# Patient Record
Sex: FEMALE | Race: ASIAN | NOT HISPANIC OR LATINO | URBAN - METROPOLITAN AREA
[De-identification: names, ages, dates, MRNs, and addresses within clinical notes are randomized per-mention and may not be internally consistent; named-entity substitution may affect disease eponyms.]

---

## 2017-02-03 ENCOUNTER — EMERGENCY (EMERGENCY)
Facility: HOSPITAL | Age: 72
LOS: 1 days | Discharge: HOME | End: 2017-02-03
Attending: EMERGENCY MEDICINE | Admitting: EMERGENCY MEDICINE
Payer: COMMERCIAL

## 2017-02-03 VITALS
SYSTOLIC BLOOD PRESSURE: 175 MMHG | HEART RATE: 101 BPM | TEMPERATURE: 98 F | RESPIRATION RATE: 20 BRPM | OXYGEN SATURATION: 99 % | DIASTOLIC BLOOD PRESSURE: 93 MMHG

## 2017-02-03 VITALS
RESPIRATION RATE: 18 BRPM | DIASTOLIC BLOOD PRESSURE: 71 MMHG | TEMPERATURE: 99 F | SYSTOLIC BLOOD PRESSURE: 145 MMHG | HEART RATE: 97 BPM | OXYGEN SATURATION: 98 %

## 2017-02-03 PROCEDURE — 70450 CT HEAD/BRAIN W/O DYE: CPT | Mod: 26

## 2017-02-03 PROCEDURE — 99284 EMERGENCY DEPT VISIT MOD MDM: CPT | Mod: GC

## 2017-02-03 PROCEDURE — 70450 CT HEAD/BRAIN W/O DYE: CPT

## 2017-02-03 PROCEDURE — 72125 CT NECK SPINE W/O DYE: CPT | Mod: 26

## 2017-02-03 PROCEDURE — 72125 CT NECK SPINE W/O DYE: CPT

## 2017-02-03 PROCEDURE — 99284 EMERGENCY DEPT VISIT MOD MDM: CPT | Mod: 25

## 2017-02-03 NOTE — ED PROVIDER NOTE - ATTENDING CONTRIBUTION TO CARE
fall off bed onto left side of head, pain in neck with flexion and extension, no new pain elsewhere, no parathesias, GCS 15. Milton Morelos MD (attending)

## 2017-02-03 NOTE — ED PROVIDER NOTE - OBJECTIVE STATEMENT
70yo F pmhx of pbc , and DMII on insulin co left face pain s/p mechanical fall today. Slipped and hit left maxillae on sink. Also co of grandual onselt neck pain worse with felxion or extension. did not take otc meds for pain. No loc no change in vision, pain with eye. movement, headache, jaw pain, focal numbness or weakness.

## 2017-02-03 NOTE — ED ADULT NURSE NOTE - OBJECTIVE STATEMENT
Pt ambulatory to ED in no apparent distress c/o neck and facial pain s/p trip and fall.  Pt AXOX4, gross neuro intact.  Pt has neck stiffness and pt c/o left upper check bone pain.  Pt states she tripped forward and hit face on bathroom sink.  Pt denies LOC, N/V, F/C, pain/burning on urination, dark stools, bleeding, CP, SOB.  Pt with son at bedside.  Pt well appearing.  Will continue to monitor.

## 2017-02-03 NOTE — ED PROVIDER NOTE - PLAN OF CARE
Follow up with your PMD within 48-72hrs. Take all of your medications as previously prescribed.  Apply warm compresses to your neck 2-3 times/day.  Take Tylenol 650mg 1 tab every 4-6 hours as needed for pain. Worsening or new weakness, pain, change in vision, numbness, fever, chills return to ER

## 2017-02-03 NOTE — ED PROVIDER NOTE - CARE PLAN
Principal Discharge DX:	Neck injuries, initial encounter Principal Discharge DX:	Neck injuries, initial encounter  Instructions for follow-up, activity and diet:	Follow up with your PMD within 48-72hrs. Take all of your medications as previously prescribed.  Apply warm compresses to your neck 2-3 times/day.  Take Tylenol 650mg 1 tab every 4-6 hours as needed for pain. Worsening or new weakness, pain, change in vision, numbness, fever, chills return to ER

## 2017-02-03 NOTE — ED PROVIDER NOTE - PROGRESS NOTE DETAILS
The patient states that the pain is tolerable and declines any medications for pain at this time.  The patient understands that they can request medications for pain at any time. Milton Morelos MD (attending)
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
## 2017-02-07 DIAGNOSIS — Z79.4 LONG TERM (CURRENT) USE OF INSULIN: ICD-10-CM

## 2017-02-07 DIAGNOSIS — I10 ESSENTIAL (PRIMARY) HYPERTENSION: ICD-10-CM

## 2017-02-07 DIAGNOSIS — S19.9XXA UNSPECIFIED INJURY OF NECK, INITIAL ENCOUNTER: ICD-10-CM

## 2017-02-07 DIAGNOSIS — Z88.8 ALLERGY STATUS TO OTHER DRUGS, MEDICAMENTS AND BIOLOGICAL SUBSTANCES STATUS: ICD-10-CM
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
## 2017-02-07 DIAGNOSIS — E11.9 TYPE 2 DIABETES MELLITUS WITHOUT COMPLICATIONS: ICD-10-CM

## 2017-02-07 DIAGNOSIS — Z88.0 ALLERGY STATUS TO PENICILLIN: ICD-10-CM
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
## 2017-02-07 DIAGNOSIS — M54.2 CERVICALGIA: ICD-10-CM

## 2019-07-18 NOTE — ED PROVIDER NOTE - PHYSICAL EXAMINATION
denies pain/discomfort left point ttp on maxillae, minimal swelling. EOMI, no ttp or crepitus over periorbital or nasal bones, no nasal septal hematoma.MMM, no midline neck tenderness. right paraspinal neck discomfort to flexion. RRR, CTABL,

## 2019-09-26 ENCOUNTER — INPATIENT (INPATIENT)
Facility: HOSPITAL | Age: 74
LOS: 4 days | Discharge: HOME | DRG: 432 | End: 2019-10-01
Attending: STUDENT IN AN ORGANIZED HEALTH CARE EDUCATION/TRAINING PROGRAM | Admitting: INTERNAL MEDICINE
Payer: COMMERCIAL

## 2019-09-26 VITALS
WEIGHT: 108.03 LBS | HEART RATE: 94 BPM | SYSTOLIC BLOOD PRESSURE: 142 MMHG | DIASTOLIC BLOOD PRESSURE: 72 MMHG | TEMPERATURE: 98 F | HEIGHT: 62 IN | RESPIRATION RATE: 18 BRPM | OXYGEN SATURATION: 96 %

## 2019-09-26 DIAGNOSIS — Z90.13 ACQUIRED ABSENCE OF BILATERAL BREASTS AND NIPPLES: Chronic | ICD-10-CM

## 2019-09-26 DIAGNOSIS — K74.60 UNSPECIFIED CIRRHOSIS OF LIVER: ICD-10-CM

## 2019-09-26 LAB
ALBUMIN SERPL ELPH-MCNC: 3.7 G/DL — SIGNIFICANT CHANGE UP (ref 3.3–5)
ALP SERPL-CCNC: 130 U/L — HIGH (ref 40–120)
ALT FLD-CCNC: 32 U/L — SIGNIFICANT CHANGE UP (ref 10–45)
ANION GAP SERPL CALC-SCNC: 10 MMOL/L — SIGNIFICANT CHANGE UP (ref 5–17)
APTT BLD: 29.2 SEC — SIGNIFICANT CHANGE UP (ref 27.5–36.3)
AST SERPL-CCNC: 37 U/L — SIGNIFICANT CHANGE UP (ref 10–40)
BASE EXCESS BLDV CALC-SCNC: 5.7 MMOL/L — HIGH (ref -2–2)
BASOPHILS # BLD AUTO: 0 K/UL — SIGNIFICANT CHANGE UP (ref 0–0.2)
BASOPHILS NFR BLD AUTO: 0.4 % — SIGNIFICANT CHANGE UP (ref 0–2)
BILIRUB SERPL-MCNC: 0.7 MG/DL — SIGNIFICANT CHANGE UP (ref 0.2–1.2)
BLD GP AB SCN SERPL QL: NEGATIVE — SIGNIFICANT CHANGE UP
BUN SERPL-MCNC: 24 MG/DL — HIGH (ref 7–23)
CA-I SERPL-SCNC: 1.2 MMOL/L — SIGNIFICANT CHANGE UP (ref 1.12–1.3)
CALCIUM SERPL-MCNC: 9.7 MG/DL — SIGNIFICANT CHANGE UP (ref 8.4–10.5)
CHLORIDE BLDV-SCNC: 108 MMOL/L — SIGNIFICANT CHANGE UP (ref 96–108)
CHLORIDE SERPL-SCNC: 102 MMOL/L — SIGNIFICANT CHANGE UP (ref 96–108)
CO2 BLDV-SCNC: 32 MMOL/L — HIGH (ref 22–30)
CO2 SERPL-SCNC: 28 MMOL/L — SIGNIFICANT CHANGE UP (ref 22–31)
CREAT SERPL-MCNC: 0.58 MG/DL — SIGNIFICANT CHANGE UP (ref 0.5–1.3)
EOSINOPHIL # BLD AUTO: 0.2 K/UL — SIGNIFICANT CHANGE UP (ref 0–0.5)
EOSINOPHIL NFR BLD AUTO: 3.7 % — SIGNIFICANT CHANGE UP (ref 0–6)
GAS PNL BLDV: 140 MMOL/L — SIGNIFICANT CHANGE UP (ref 135–145)
GAS PNL BLDV: SIGNIFICANT CHANGE UP
GAS PNL BLDV: SIGNIFICANT CHANGE UP
GLUCOSE BLDV-MCNC: 222 MG/DL — HIGH (ref 70–99)
GLUCOSE SERPL-MCNC: 237 MG/DL — HIGH (ref 70–99)
HCO3 BLDV-SCNC: 30 MMOL/L — HIGH (ref 21–29)
HCT VFR BLD CALC: 38 % — SIGNIFICANT CHANGE UP (ref 34.5–45)
HCT VFR BLDA CALC: 39 % — SIGNIFICANT CHANGE UP (ref 39–50)
HGB BLD CALC-MCNC: 12.6 G/DL — SIGNIFICANT CHANGE UP (ref 11.5–15.5)
HGB BLD-MCNC: 12.3 G/DL — SIGNIFICANT CHANGE UP (ref 11.5–15.5)
INR BLD: 1.13 RATIO — SIGNIFICANT CHANGE UP (ref 0.88–1.16)
LACTATE BLDV-MCNC: 2.1 MMOL/L — HIGH (ref 0.7–2)
LACTATE BLDV-MCNC: 2.2 MMOL/L — HIGH (ref 0.7–2)
LYMPHOCYTES # BLD AUTO: 1.2 K/UL — SIGNIFICANT CHANGE UP (ref 1–3.3)
LYMPHOCYTES # BLD AUTO: 20.6 % — SIGNIFICANT CHANGE UP (ref 13–44)
MCHC RBC-ENTMCNC: 32.4 GM/DL — SIGNIFICANT CHANGE UP (ref 32–36)
MCHC RBC-ENTMCNC: 33.3 PG — SIGNIFICANT CHANGE UP (ref 27–34)
MCV RBC AUTO: 103 FL — HIGH (ref 80–100)
MONOCYTES # BLD AUTO: 0.5 K/UL — SIGNIFICANT CHANGE UP (ref 0–0.9)
MONOCYTES NFR BLD AUTO: 8.2 % — SIGNIFICANT CHANGE UP (ref 2–14)
NEUTROPHILS # BLD AUTO: 4 K/UL — SIGNIFICANT CHANGE UP (ref 1.8–7.4)
NEUTROPHILS NFR BLD AUTO: 67.1 % — SIGNIFICANT CHANGE UP (ref 43–77)
OB PNL STL: POSITIVE
PCO2 BLDV: 47 MMHG — SIGNIFICANT CHANGE UP (ref 35–50)
PH BLDV: 7.43 — SIGNIFICANT CHANGE UP (ref 7.35–7.45)
PLATELET # BLD AUTO: 100 K/UL — LOW (ref 150–400)
PO2 BLDV: 36 MMHG — SIGNIFICANT CHANGE UP (ref 25–45)
POTASSIUM BLDV-SCNC: 3.8 MMOL/L — SIGNIFICANT CHANGE UP (ref 3.5–5.3)
POTASSIUM SERPL-MCNC: 3.9 MMOL/L — SIGNIFICANT CHANGE UP (ref 3.5–5.3)
POTASSIUM SERPL-SCNC: 3.9 MMOL/L — SIGNIFICANT CHANGE UP (ref 3.5–5.3)
PROT SERPL-MCNC: 7.2 G/DL — SIGNIFICANT CHANGE UP (ref 6–8.3)
PROTHROM AB SERPL-ACNC: 13 SEC — HIGH (ref 10–12.9)
RBC # BLD: 3.7 M/UL — LOW (ref 3.8–5.2)
RBC # FLD: 12.5 % — SIGNIFICANT CHANGE UP (ref 10.3–14.5)
RH IG SCN BLD-IMP: POSITIVE — SIGNIFICANT CHANGE UP
SAO2 % BLDV: 65 % — LOW (ref 67–88)
SODIUM SERPL-SCNC: 140 MMOL/L — SIGNIFICANT CHANGE UP (ref 135–145)
WBC # BLD: 6 K/UL — SIGNIFICANT CHANGE UP (ref 3.8–10.5)
WBC # FLD AUTO: 6 K/UL — SIGNIFICANT CHANGE UP (ref 3.8–10.5)

## 2019-09-26 PROCEDURE — 99285 EMERGENCY DEPT VISIT HI MDM: CPT | Mod: GC

## 2019-09-26 PROCEDURE — 71045 X-RAY EXAM CHEST 1 VIEW: CPT | Mod: 26

## 2019-09-26 RX ORDER — PANTOPRAZOLE SODIUM 20 MG/1
80 TABLET, DELAYED RELEASE ORAL ONCE
Refills: 0 | Status: COMPLETED | OUTPATIENT
Start: 2019-09-26 | End: 2019-09-26

## 2019-09-26 RX ORDER — FAMOTIDINE 10 MG/ML
20 INJECTION INTRAVENOUS ONCE
Refills: 0 | Status: COMPLETED | OUTPATIENT
Start: 2019-09-26 | End: 2019-09-26

## 2019-09-26 RX ORDER — SODIUM CHLORIDE 9 MG/ML
1000 INJECTION INTRAMUSCULAR; INTRAVENOUS; SUBCUTANEOUS ONCE
Refills: 0 | Status: COMPLETED | OUTPATIENT
Start: 2019-09-26 | End: 2019-09-26

## 2019-09-26 RX ORDER — FAMOTIDINE 10 MG/ML
20 INJECTION INTRAVENOUS ONCE
Refills: 0 | Status: DISCONTINUED | OUTPATIENT
Start: 2019-09-26 | End: 2019-09-26

## 2019-09-26 RX ADMIN — PANTOPRAZOLE SODIUM 80 MILLIGRAM(S): 20 TABLET, DELAYED RELEASE ORAL at 23:25

## 2019-09-26 RX ADMIN — SODIUM CHLORIDE 1000 MILLILITER(S): 9 INJECTION INTRAMUSCULAR; INTRAVENOUS; SUBCUTANEOUS at 19:28

## 2019-09-26 RX ADMIN — SODIUM CHLORIDE 1000 MILLILITER(S): 9 INJECTION INTRAMUSCULAR; INTRAVENOUS; SUBCUTANEOUS at 20:56

## 2019-09-26 RX ADMIN — FAMOTIDINE 20 MILLIGRAM(S): 10 INJECTION INTRAVENOUS at 19:43

## 2019-09-26 NOTE — ED ADULT NURSE NOTE - NSIMPLEMENTINTERV_GEN_ALL_ED
Implemented All Universal Safety Interventions:  [place] to call system. Call bell, personal items and telephone within reach. Instruct patient to call for assistance. Room bathroom lighting operational. Non-slip footwear when patient is off stretcher. Physically safe environment: no spills, clutter or unnecessary equipment. Stretcher in lowest position, wheels locked, appropriate side rails in place.

## 2019-09-26 NOTE — ED PROVIDER NOTE - NS ED ROS FT
CONSTITUTIONAL: CHILLS, FATIGUE. No fevers, lightheadedness, weakness  EYES: No loss of vision, double vision, blurry vision  MOUTH/THROAT: No sore throat, painful swallowing, lumps on neck  CV: No chest pain, palpitations  PULM: No cough, shortness of breath  GI: VOMITING. No abdominal pain, nausea, diarrhea, constipation  : DARK STOOL. No dysuria, hematuria, polyuria  SKIN: No rashes, swelling

## 2019-09-26 NOTE — ED PROVIDER NOTE - OBJECTIVE STATEMENT
74 y.o. 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 Never experienced before. No on any anticoagulants. Endorses chills and feeling jet lagged. Recently came back from vacation in [place] 5 days ago. Denies fever, cough, shortness of breath, chest pain, abdominal pain, nausea, diarrhea, hematuria, back pain, leg pain or swelling. Had a colonoscopy 11 years ago (patient does not remember GI doctor's name). Never had an endoscopy. Not on any oral steroids, pepcid, or protonix. 74 y.o. 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 Never experienced before. No on any anticoagulants. Endorses chills and feeling jet lagged. Recently came back from vacation in [place] 5 days ago. Denies fever, cough, shortness of breath, chest pain, abdominal pain, nausea, diarrhea, hematuria, back pain, leg pain or swelling. Had a colonoscopy 11 years ago (patient does not remember GI doctor's name). Never had an endoscopy. Not on any oral steroids, pepcid, or protonix. Denies being on iron or using peptobismol.

## 2019-09-26 NOTE — ED PROVIDER NOTE - PHYSICAL EXAMINATION
GENERAL: Elderly female, lying in bed, tired appearing, HR 94 otherwise Vital signs are within normal limits  HEENT: NC/AT, conjunctiva noninjected and sclera anicteric, moist mucous membranes  NECK: Supple, trachea midline. No crepitations palpated.  LUNG: CTAB, no w/r/r appreciated, good respiratory effort  CV: RRR, no m/r/g appreciated, Pulses- Radial: 2+ b/l  ABDOMEN: Soft, NTND, no rebound or guarding  NEURO: AAOx4 (to person, place, time, event)  SKIN: Warm, dry, well perfused, no evidence of rash GENERAL: Elderly female, lying in bed, tired appearing, HR 94 otherwise Vital signs are within normal limits  HEENT: NC/AT, conjunctiva noninjected and sclera anicteric, moist mucous membranes  NECK: Supple, trachea midline. No crepitations palpated.  LUNG: CTAB, no w/r/r appreciated, good respiratory effort  CV: RRR, no m/r/g appreciated, Pulses- Radial: 2+ b/l  ABDOMEN: Soft, NTND, no rebound or guarding  : Rectal exam demonstrated dark stool. No bright blood. No internal or external hemorrhoids or fissures appreciated.   NEURO: AAOx4 (to person, place, time, event)  SKIN: Warm, dry, well perfused, no evidence of rash

## 2019-09-26 NOTE — ED PROVIDER NOTE - CLINICAL SUMMARY MEDICAL DECISION MAKING FREE TEXT BOX
74 y.o. female hx of primary biliary cirrhosis, breath cancer s/p mastectomy, HTN, DM presenting to the ED for 3 episodes of bloody emesis 74 y.o. female hx of primary biliary cirrhosis, breath cancer s/p mastectomy, HTN, DM presenting to the ED for 3 episodes of bloody emesis and dark stools not on anticoagulants, not ETOH use, without signs of symptomatic anemia on history of physical concern for complications related to primary biliary cirrhosis vs acute GI bleed vs infectious etiology.  Will get labs, FOBT, give fluids, pepcid, and reassess. 74 y.o. female hx of primary biliary cirrhosis, breath cancer s/p mastectomy, HTN, DM presenting to the ED for 3 episodes of bloody emesis and dark stools not on anticoagulants, not ETOH use, without signs of symptomatic anemia on history of physical concern for complications related to primary biliary cirrhosis vs acute GI bleed vs infectious etiology. will obtain blood work and possible admission ZR  Will get labs, FOBT, give fluids, pepcid, and reassess.

## 2019-09-26 NOTE — ED PROVIDER NOTE - PROGRESS NOTE DETAILS
Juan Oshea D.O., PGY1 (Resident)  Spoke to GI. Will see patient in AM. In meantime, would like to 1. give ppi, 2. keep NPO, 3. admit.   Spoke to patient and she is agreeable. Juan Oshea D.O., PGY1 (Resident)  Spoke to hospitalist Dr. Shook who accepted patient for admission.

## 2019-09-26 NOTE — ED ADULT NURSE NOTE - OBJECTIVE STATEMENT
[id number] pt 74yf aox4 bib ems/[facility], states vomitted bld x 3 and melena today started at 8a, pt w/ hx bl mastectomy w/ lymph node removal last was 2007 w/ chemo therapy completed, pt denies sob, denies cp, able to verbalize concerns, pending labs, hx recent travel from [place] x 1wk ago, in no acute distress pending eval/gc

## 2019-09-27 DIAGNOSIS — E11.9 TYPE 2 DIABETES MELLITUS WITHOUT COMPLICATIONS: ICD-10-CM

## 2019-09-27 DIAGNOSIS — K92.2 GASTROINTESTINAL HEMORRHAGE, UNSPECIFIED: ICD-10-CM

## 2019-09-27 DIAGNOSIS — C50.919 MALIGNANT NEOPLASM OF UNSPECIFIED SITE OF UNSPECIFIED FEMALE BREAST: ICD-10-CM

## 2019-09-27 DIAGNOSIS — Z29.9 ENCOUNTER FOR PROPHYLACTIC MEASURES, UNSPECIFIED: ICD-10-CM

## 2019-09-27 DIAGNOSIS — K74.3 PRIMARY BILIARY CIRRHOSIS: ICD-10-CM

## 2019-09-27 LAB
ANION GAP SERPL CALC-SCNC: 7 MMOL/L — SIGNIFICANT CHANGE UP (ref 5–17)
BUN SERPL-MCNC: 16 MG/DL — SIGNIFICANT CHANGE UP (ref 7–23)
CALCIUM SERPL-MCNC: 8.7 MG/DL — SIGNIFICANT CHANGE UP (ref 8.4–10.5)
CHLORIDE SERPL-SCNC: 109 MMOL/L — HIGH (ref 96–108)
CO2 SERPL-SCNC: 25 MMOL/L — SIGNIFICANT CHANGE UP (ref 22–31)
CREAT SERPL-MCNC: 0.53 MG/DL — SIGNIFICANT CHANGE UP (ref 0.5–1.3)
GLUCOSE SERPL-MCNC: 145 MG/DL — HIGH (ref 70–99)
HCT VFR BLD CALC: 28.1 % — LOW (ref 34.5–45)
HCT VFR BLD CALC: 31 % — LOW (ref 34.5–45)
HGB BLD-MCNC: 10.2 G/DL — LOW (ref 11.5–15.5)
HGB BLD-MCNC: 9.2 G/DL — LOW (ref 11.5–15.5)
MAGNESIUM SERPL-MCNC: 1.8 MG/DL — SIGNIFICANT CHANGE UP (ref 1.6–2.6)
MCHC RBC-ENTMCNC: 32.7 GM/DL — SIGNIFICANT CHANGE UP (ref 32–36)
MCHC RBC-ENTMCNC: 32.7 GM/DL — SIGNIFICANT CHANGE UP (ref 32–36)
MCHC RBC-ENTMCNC: 33.6 PG — SIGNIFICANT CHANGE UP (ref 27–34)
MCHC RBC-ENTMCNC: 34.3 PG — HIGH (ref 27–34)
MCV RBC AUTO: 103 FL — HIGH (ref 80–100)
MCV RBC AUTO: 104.9 FL — HIGH (ref 80–100)
PLATELET # BLD AUTO: 76 K/UL — LOW (ref 150–400)
PLATELET # BLD AUTO: 77 K/UL — LOW (ref 150–400)
POTASSIUM SERPL-MCNC: 3.9 MMOL/L — SIGNIFICANT CHANGE UP (ref 3.5–5.3)
POTASSIUM SERPL-SCNC: 3.9 MMOL/L — SIGNIFICANT CHANGE UP (ref 3.5–5.3)
RBC # BLD: 2.68 M/UL — LOW (ref 3.8–5.2)
RBC # BLD: 3.02 M/UL — LOW (ref 3.8–5.2)
RBC # FLD: 12.4 % — SIGNIFICANT CHANGE UP (ref 10.3–14.5)
RBC # FLD: 13.9 % — SIGNIFICANT CHANGE UP (ref 10.3–14.5)
SODIUM SERPL-SCNC: 141 MMOL/L — SIGNIFICANT CHANGE UP (ref 135–145)
WBC # BLD: 3.57 K/UL — LOW (ref 3.8–10.5)
WBC # BLD: 4.4 K/UL — SIGNIFICANT CHANGE UP (ref 3.8–10.5)
WBC # FLD AUTO: 3.57 K/UL — LOW (ref 3.8–10.5)
WBC # FLD AUTO: 4.4 K/UL — SIGNIFICANT CHANGE UP (ref 3.8–10.5)

## 2019-09-27 PROCEDURE — 43244 EGD VARICES LIGATION: CPT | Mod: GC

## 2019-09-27 PROCEDURE — 74177 CT ABD & PELVIS W/CONTRAST: CPT | Mod: 26

## 2019-09-27 PROCEDURE — 99223 1ST HOSP IP/OBS HIGH 75: CPT | Mod: GC

## 2019-09-27 PROCEDURE — 93975 VASCULAR STUDY: CPT | Mod: 26

## 2019-09-27 RX ORDER — INSULIN GLARGINE 100 [IU]/ML
6 INJECTION, SOLUTION SUBCUTANEOUS AT BEDTIME
Refills: 0 | Status: DISCONTINUED | OUTPATIENT
Start: 2019-09-27 | End: 2019-09-28

## 2019-09-27 RX ORDER — LOSARTAN POTASSIUM 100 MG/1
50 TABLET, FILM COATED ORAL
Qty: 0 | Refills: 0 | DISCHARGE

## 2019-09-27 RX ORDER — URSODIOL 250 MG/1
250 TABLET, FILM COATED ORAL THREE TIMES A DAY
Refills: 0 | Status: DISCONTINUED | OUTPATIENT
Start: 2019-09-27 | End: 2019-10-01

## 2019-09-27 RX ORDER — OCTREOTIDE ACETATE 200 UG/ML
50 INJECTION, SOLUTION INTRAVENOUS; SUBCUTANEOUS ONCE
Refills: 0 | Status: COMPLETED | OUTPATIENT
Start: 2019-09-27 | End: 2019-09-27

## 2019-09-27 RX ORDER — ONDANSETRON 8 MG/1
4 TABLET, FILM COATED ORAL EVERY 6 HOURS
Refills: 0 | Status: DISCONTINUED | OUTPATIENT
Start: 2019-09-27 | End: 2019-10-01

## 2019-09-27 RX ORDER — DEXTROSE 50 % IN WATER 50 %
12.5 SYRINGE (ML) INTRAVENOUS ONCE
Refills: 0 | Status: DISCONTINUED | OUTPATIENT
Start: 2019-09-27 | End: 2019-10-01

## 2019-09-27 RX ORDER — SERTRALINE 25 MG/1
25 TABLET, FILM COATED ORAL DAILY
Refills: 0 | Status: DISCONTINUED | OUTPATIENT
Start: 2019-09-27 | End: 2019-10-01

## 2019-09-27 RX ORDER — INFLUENZA VIRUS VACCINE 15; 15; 15; 15 UG/.5ML; UG/.5ML; UG/.5ML; UG/.5ML
0.5 SUSPENSION INTRAMUSCULAR ONCE
Refills: 0 | Status: COMPLETED | OUTPATIENT
Start: 2019-09-27 | End: 2019-10-01

## 2019-09-27 RX ORDER — CEFTRIAXONE 500 MG/1
1000 INJECTION, POWDER, FOR SOLUTION INTRAMUSCULAR; INTRAVENOUS EVERY 24 HOURS
Refills: 0 | Status: DISCONTINUED | OUTPATIENT
Start: 2019-09-27 | End: 2019-10-01

## 2019-09-27 RX ORDER — DEXTROSE 50 % IN WATER 50 %
25 SYRINGE (ML) INTRAVENOUS ONCE
Refills: 0 | Status: DISCONTINUED | OUTPATIENT
Start: 2019-09-27 | End: 2019-10-01

## 2019-09-27 RX ORDER — SODIUM CHLORIDE 9 MG/ML
1000 INJECTION, SOLUTION INTRAVENOUS
Refills: 0 | Status: DISCONTINUED | OUTPATIENT
Start: 2019-09-27 | End: 2019-10-01

## 2019-09-27 RX ORDER — PANTOPRAZOLE SODIUM 20 MG/1
8 TABLET, DELAYED RELEASE ORAL
Qty: 80 | Refills: 0 | Status: DISCONTINUED | OUTPATIENT
Start: 2019-09-27 | End: 2019-09-27

## 2019-09-27 RX ORDER — GLUCAGON INJECTION, SOLUTION 0.5 MG/.1ML
1 INJECTION, SOLUTION SUBCUTANEOUS ONCE
Refills: 0 | Status: DISCONTINUED | OUTPATIENT
Start: 2019-09-27 | End: 2019-10-01

## 2019-09-27 RX ORDER — DEXTROSE 50 % IN WATER 50 %
15 SYRINGE (ML) INTRAVENOUS ONCE
Refills: 0 | Status: DISCONTINUED | OUTPATIENT
Start: 2019-09-27 | End: 2019-10-01

## 2019-09-27 RX ORDER — SODIUM CHLORIDE 9 MG/ML
1000 INJECTION INTRAMUSCULAR; INTRAVENOUS; SUBCUTANEOUS
Refills: 0 | Status: DISCONTINUED | OUTPATIENT
Start: 2019-09-27 | End: 2019-10-01

## 2019-09-27 RX ORDER — OCTREOTIDE ACETATE 200 UG/ML
50 INJECTION, SOLUTION INTRAVENOUS; SUBCUTANEOUS
Qty: 500 | Refills: 0 | Status: DISCONTINUED | OUTPATIENT
Start: 2019-09-27 | End: 2019-10-01

## 2019-09-27 RX ORDER — INSULIN LISPRO 100/ML
VIAL (ML) SUBCUTANEOUS EVERY 6 HOURS
Refills: 0 | Status: DISCONTINUED | OUTPATIENT
Start: 2019-09-27 | End: 2019-09-30

## 2019-09-27 RX ADMIN — SERTRALINE 25 MILLIGRAM(S): 25 TABLET, FILM COATED ORAL at 12:33

## 2019-09-27 RX ADMIN — OCTREOTIDE ACETATE 10 MICROGRAM(S)/HR: 200 INJECTION, SOLUTION INTRAVENOUS; SUBCUTANEOUS at 15:27

## 2019-09-27 RX ADMIN — PANTOPRAZOLE SODIUM 10 MG/HR: 20 TABLET, DELAYED RELEASE ORAL at 05:36

## 2019-09-27 RX ADMIN — INSULIN GLARGINE 6 UNIT(S): 100 INJECTION, SOLUTION SUBCUTANEOUS at 22:13

## 2019-09-27 RX ADMIN — CEFTRIAXONE 100 MILLIGRAM(S): 500 INJECTION, POWDER, FOR SOLUTION INTRAMUSCULAR; INTRAVENOUS at 15:30

## 2019-09-27 RX ADMIN — URSODIOL 250 MILLIGRAM(S): 250 TABLET, FILM COATED ORAL at 05:18

## 2019-09-27 RX ADMIN — SODIUM CHLORIDE 50 MILLILITER(S): 9 INJECTION INTRAMUSCULAR; INTRAVENOUS; SUBCUTANEOUS at 05:19

## 2019-09-27 RX ADMIN — OCTREOTIDE ACETATE 50 MICROGRAM(S): 200 INJECTION, SOLUTION INTRAVENOUS; SUBCUTANEOUS at 15:26

## 2019-09-27 RX ADMIN — URSODIOL 250 MILLIGRAM(S): 250 TABLET, FILM COATED ORAL at 22:13

## 2019-09-27 RX ADMIN — INSULIN GLARGINE 6 UNIT(S): 100 INJECTION, SOLUTION SUBCUTANEOUS at 04:33

## 2019-09-27 NOTE — CONSULT NOTE ADULT - SUBJECTIVE AND OBJECTIVE BOX
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    The patient states that last night at 6pm she vomited approximately 3 cups worth of bright red blood. Shortly thereafter she had black tarry stool. She otherwise denies bloody stools. She otherwise denies abdominal pain, diarrhea, and constipation. She denies prior history of GI bleeding. She denies NSAID use.    She has never had an upper endoscopy. She states she had a colonoscopy 11 years ago, which was reportedly normal.    Allergies:  epinephrine (Rash)  pcn seafood (Unknown)  penicillin (Rash)    Home Medications:    · 	Lantus: Last Dose Taken:  , 12  subcutaneous once a day (at bedtime)  · 	sertraline 25 mg oral tablet: Last Dose Taken:  , 1 tab(s) orally once a day  · 	losartan 50 mg oral tablet: Last Dose Taken:  , 1 tab(s) orally once a day  · 	ursodiol 250 mg oral tablet: Last Dose Taken:  , 1 tab(s) orally 3 times a day    Hospital Medications:  dextrose 40% Gel 15 Gram(s) Oral once PRN  dextrose 5%. 1000 milliLiter(s) IV Continuous <Continuous>  dextrose 50% Injectable 12.5 Gram(s) IV Push once  dextrose 50% Injectable 25 Gram(s) IV Push once  dextrose 50% Injectable 25 Gram(s) IV Push once  glucagon  Injectable 1 milliGRAM(s) IntraMuscular once PRN  influenza   Vaccine 0.5 milliLiter(s) IntraMuscular once  insulin glargine Injectable (LANTUS) 6 Unit(s) SubCutaneous at bedtime  insulin lispro (HumaLOG) corrective regimen sliding scale   SubCutaneous every 6 hours  ondansetron Injectable 4 milliGRAM(s) IV Push every 6 hours PRN  pantoprazole Infusion 8 mG/Hr IV Continuous <Continuous>  sertraline 25 milliGRAM(s) Oral daily  sodium chloride 0.9%. 1000 milliLiter(s) IV Continuous <Continuous>  ursodiol Tablet 250 milliGRAM(s) Oral three times a day    PMHX/PSHX:  Breast cancer  Panic disorder  Hypertension  Cirrhosis  Diabetes  H/O bilateral mastectomy    Family history:  FH: breast cancer    Denies family history of colon cancer/polyps, stomach cancer/polyps, pancreatic cancer/masses, liver cancer/disease, ovarian cancer and endometrial cancer.    Social History:     Tob: Denies  EtOH: Denies  Illicit Drugs: Denies    ROS:     General:  No wt loss, fevers, chills, night sweats, fatigue  Eyes:  Good vision, no reported pain  ENT:  No sore throat, pain, runny nose, dysphagia  CV:  No pain, palpitations, hypo/hypertension  Pulm:  No dyspnea, cough, tachypnea, wheezing  GI:  No pain, No nausea, + bloody vomiting, No diarrhea, No constipation, No weight loss, No fever, No pruritis, No rectal bleeding, + tarry stools, No dysphagia,  :  No pain, bleeding, incontinence, nocturia  Muscle:  No pain, weakness  Neuro:  No weakness, tingling, memory problems  Psych:  No fatigue, insomnia, mood problems, depression  Endocrine:  No polyuria, polydipsia, cold/heat intolerance  Heme:  No petechiae, ecchymosis, easy bruisability  Skin:  No rash, tattoos, scars, edema    PHYSICAL EXAM:     GENERAL:  No acute distress  HEENT:  Normocephalic/atraumatic, no scleral icterus  CHEST:  Clear to auscultation bilaterally, no wheezes/rales/ronchi, no accessory muscle use  HEART:  Regular rate and rhythm, no murmurs/rubs/gallops  ABDOMEN:  Soft, non-tender, non-distended, normoactive bowel sounds, no masses, no hepato-splenomegaly, no signs of chronic liver disease  RECTAL: Deferred as patient in hallway  EXTREMITIES: No cyanosis, clubbing, or edema  SKIN:  No rash/erythema  NEURO:  Alert and oriented x 3    Vital Signs:  Vital Signs Last 24 Hrs  T(C): 37 (27 Sep 2019 05:47), Max: 37 (27 Sep 2019 05:47)  T(F): 98.6 (27 Sep 2019 05:47), Max: 98.6 (27 Sep 2019 05:47)  HR: 95 (27 Sep 2019 05:47) (93 - 99)  BP: 114/66 (27 Sep 2019 05:47) (114/66 - 161/70)  BP(mean): --  RR: 18 (27 Sep 2019 05:47) (18 - 18)  SpO2: 95% (27 Sep 2019 05:47) (95% - 98%)  Daily Height in cm: 157.48 (26 Sep 2019 18:59)      LABS:                        10.2   4.4   )-----------( 77       ( 27 Sep 2019 05:45 )             31.0     Mean Cell Volume: 103.0 fl (09-27-19 @ 05:45)    09-27    141  |  109<H>  |  16  ----------------------------<  145<H>  3.9   |  25  |  0.53    Ca    8.7      27 Sep 2019 06:17  Mg     1.8     09-27    TPro  7.2  /  Alb  3.7  /  TBili  0.7  /  DBili  x   /  AST  37  /  ALT  32  /  AlkPhos  130<H>  09-26    LIVER FUNCTIONS - ( 26 Sep 2019 19:36 )  Alb: 3.7 g/dL / Pro: 7.2 g/dL / ALK PHOS: 130 U/L / ALT: 32 U/L / AST: 37 U/L / GGT: x           PT/INR - ( 26 Sep 2019 19:36 )   PT: 13.0 sec;   INR: 1.13 ratio         PTT - ( 26 Sep 2019 19:36 )  PTT:29.2 sec    Amylase Serum--      Lipase serum70       Ammonia--               10.2   4.4   )-----------( 77       ( 27 Sep 2019 05:45 )             31.0                         12.3   6.0   )-----------( 100      ( 26 Sep 2019 19:36 )             38.0     Imaging:    < from: Xray Chest 1 View AP/PA (09.26.19 @ 21:56) >    ******PRELIMINARY REPORT******    ******PRELIMINARY REPORT******          EXAM:  XR CHEST AP OR PA 1V                            PROCEDURE DATE:  09/26/2019      ******PRELIMINARY REPORT******    ******PRELIMINARY REPORT******              INTERPRETATION:  no emergent findings              ******PRELIMINARY REPORT******    ******PRELIMINARY REPORT******          LEXII CAMERON M.D., RADIOLOGY RESIDENT                      < end of copied text > Chief Complaint: Hematemesis    HPI:    75 y/o F w/ hx of DM, HTN, breast cancer s/p mastectomy, and 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    The patient states that last night at 6pm she vomited approximately 3 cups worth of bright red blood. Shortly thereafter she had black tarry stool. She otherwise denies bloody stools. She otherwise denies abdominal pain, diarrhea, and constipation. She denies prior history of GI bleeding. She denies NSAID use.    The patient previously followed with a Hepatologist at [facility], however, her and her  do not remember the name of her doctor.    She has never had an upper endoscopy. She states she had a colonoscopy 11 years ago, which was reportedly normal.    Allergies:  epinephrine (Rash)  pcn seafood (Unknown)  penicillin (Rash)    Home Medications:    · 	Lantus: Last Dose Taken:  , 12  subcutaneous once a day (at bedtime)  · 	sertraline 25 mg oral tablet: Last Dose Taken:  , 1 tab(s) orally once a day  · 	losartan 50 mg oral tablet: Last Dose Taken:  , 1 tab(s) orally once a day  · 	ursodiol 250 mg oral tablet: Last Dose Taken:  , 1 tab(s) orally 3 times a day    Hospital Medications:  dextrose 40% Gel 15 Gram(s) Oral once PRN  dextrose 5%. 1000 milliLiter(s) IV Continuous <Continuous>  dextrose 50% Injectable 12.5 Gram(s) IV Push once  dextrose 50% Injectable 25 Gram(s) IV Push once  dextrose 50% Injectable 25 Gram(s) IV Push once  glucagon  Injectable 1 milliGRAM(s) IntraMuscular once PRN  influenza   Vaccine 0.5 milliLiter(s) IntraMuscular once  insulin glargine Injectable (LANTUS) 6 Unit(s) SubCutaneous at bedtime  insulin lispro (HumaLOG) corrective regimen sliding scale   SubCutaneous every 6 hours  ondansetron Injectable 4 milliGRAM(s) IV Push every 6 hours PRN  pantoprazole Infusion 8 mG/Hr IV Continuous <Continuous>  sertraline 25 milliGRAM(s) Oral daily  sodium chloride 0.9%. 1000 milliLiter(s) IV Continuous <Continuous>  ursodiol Tablet 250 milliGRAM(s) Oral three times a day    PMHX/PSHX:  Breast cancer  Panic disorder  Hypertension  Cirrhosis  Diabetes  H/O bilateral mastectomy    Family history:  FH: breast cancer    Denies family history of colon cancer/polyps, stomach cancer/polyps, pancreatic cancer/masses, liver cancer/disease, ovarian cancer and endometrial cancer.    Social History:     Tob: Denies  EtOH: Denies  Illicit Drugs: Denies    ROS:     General:  No wt loss, fevers, chills, night sweats, fatigue  Eyes:  Good vision, no reported pain  ENT:  No sore throat, pain, runny nose, dysphagia  CV:  No pain, palpitations, hypo/hypertension  Pulm:  No dyspnea, cough, tachypnea, wheezing  GI:  No pain, No nausea, + bloody vomiting, No diarrhea, No constipation, No weight loss, No fever, No pruritis, No rectal bleeding, + tarry stools, No dysphagia,  :  No pain, bleeding, incontinence, nocturia  Muscle:  No pain, weakness  Neuro:  No weakness, tingling, memory problems  Psych:  No fatigue, insomnia, mood problems, depression  Endocrine:  No polyuria, polydipsia, cold/heat intolerance  Heme:  No petechiae, ecchymosis, easy bruisability  Skin:  No rash, tattoos, scars, edema    PHYSICAL EXAM:     GENERAL:  No acute distress  HEENT:  Normocephalic/atraumatic, no scleral icterus  CHEST:  Clear to auscultation bilaterally, no wheezes/rales/ronchi, no accessory muscle use  HEART:  Regular rate and rhythm, no murmurs/rubs/gallops  ABDOMEN:  Soft, non-tender, non-distended, normoactive bowel sounds, no masses, no hepato-splenomegaly, no signs of chronic liver disease  RECTAL: Deferred as patient in hallway  EXTREMITIES: No cyanosis, clubbing, or edema  SKIN:  No rash/erythema  NEURO:  Alert and oriented x 3    Vital Signs:  Vital Signs Last 24 Hrs  T(C): 37 (27 Sep 2019 05:47), Max: 37 (27 Sep 2019 05:47)  T(F): 98.6 (27 Sep 2019 05:47), Max: 98.6 (27 Sep 2019 05:47)  HR: 95 (27 Sep 2019 05:47) (93 - 99)  BP: 114/66 (27 Sep 2019 05:47) (114/66 - 161/70)  BP(mean): --  RR: 18 (27 Sep 2019 05:47) (18 - 18)  SpO2: 95% (27 Sep 2019 05:47) (95% - 98%)  Daily Height in cm: 157.48 (26 Sep 2019 18:59)      LABS:                        10.2   4.4   )-----------( 77       ( 27 Sep 2019 05:45 )             31.0     Mean Cell Volume: 103.0 fl (09-27-19 @ 05:45)    09-27    141  |  109<H>  |  16  ----------------------------<  145<H>  3.9   |  25  |  0.53    Ca    8.7      27 Sep 2019 06:17  Mg     1.8     09-27    TPro  7.2  /  Alb  3.7  /  TBili  0.7  /  DBili  x   /  AST  37  /  ALT  32  /  AlkPhos  130<H>  09-26    LIVER FUNCTIONS - ( 26 Sep 2019 19:36 )  Alb: 3.7 g/dL / Pro: 7.2 g/dL / ALK PHOS: 130 U/L / ALT: 32 U/L / AST: 37 U/L / GGT: x           PT/INR - ( 26 Sep 2019 19:36 )   PT: 13.0 sec;   INR: 1.13 ratio         PTT - ( 26 Sep 2019 19:36 )  PTT:29.2 sec    Amylase Serum--      Lipase serum70       Ammonia--               10.2   4.4   )-----------( 77       ( 27 Sep 2019 05:45 )             31.0                         12.3   6.0   )-----------( 100      ( 26 Sep 2019 19:36 )             38.0     Imaging:    < from: Xray Chest 1 View AP/PA (09.26.19 @ 21:56) >    ******PRELIMINARY REPORT******    ******PRELIMINARY REPORT******          EXAM:  XR CHEST AP OR PA 1V                            PROCEDURE DATE:  09/26/2019      ******PRELIMINARY REPORT******    ******PRELIMINARY REPORT******              INTERPRETATION:  no emergent findings              ******PRELIMINARY REPORT******    ******PRELIMINARY REPORT******          LEXII CAMERON M.D., RADIOLOGY RESIDENT                      < end of copied text >

## 2019-09-27 NOTE — H&P ADULT - HISTORY OF PRESENT ILLNESS
74y F w/ PMH HTN, DM, primary biliary cirrhosis on ursodeoxycholic acid, breast cancer s/p mastectomy, presenting after an episode of hematemesis and black stool today. Patient was in her USOH until yesterday night when she developed an episode of black pasty stool. Patient felt faint. While still in the bathroom, patient had 3 episodes of bright red hematemesis. No prior history of these symptoms. No on any anticoagulants. Not on iron. Not on antacids. No injuries. Recently came back from vacation in [place] 5 days ago. Denies fever, chills, cp, cough, shortness of breath, epigastric or abdominal pain, nausea, diarrhea, hematuria, back pain, leg pain or swelling. Never had an endoscopy.     In ED: T98.4, HR 94, /72, RR 18, 98% on RA. Given 2L NS bolus, protonix 80 IV, pepcid 20. 74y F w/ PMH HTN, DM, primary biliary cirrhosis on ursodeoxycholic acid, breast cancer s/p mastectomy, presenting after an episode of hematemesis and black stool today. Patient was in her USOH until yesterday night when she developed an episode of black pasty stool. Patient felt faint. While still in the bathroom, patient had 3 episodes of bright red hematemesis. No prior history of these symptoms. No on any anticoagulants. Not on iron. Not on antacids. No injuries. No history of heavy etoh use. Recently came back from vacation in [place] 5 days ago. Denies fever, chills, cp, cough, shortness of breath, epigastric or abdominal pain, nausea, diarrhea, hematuria, back pain, leg pain or swelling. Never had an endoscopy.     In ED: T98.4, HR 94, /72, RR 18, 98% on RA. Given 2L NS bolus, protonix 80 IV, pepcid 20.

## 2019-09-27 NOTE — CONSULT NOTE ADULT - ATTENDING COMMENTS
Patient was seen and examined with Hepatology team on rounds. Agree with above.   A 73 y/o woman with history of  primary biliary cholangitis with thrombocytopenia and anemia  on [place] was seen for hematemesis and melena.  Labs were reviewed. Hemodynamically stable.   She likely has PBC with cirrhosis.  MELD-Na: 8 Hematemesis and melena likely UGI bleeding from EV bleeding or PUD  Will recommend-  PPI drip, octreotide and antibiotics, trend Hb, liver tests, INR, and Cr. blood transfusion to Hb 7-8 and urgent EGD

## 2019-09-27 NOTE — H&P ADULT - ATTENDING COMMENTS
Patient seen and examined at bedside with resident. Below are my findings and assessment:     74F with PMHx of HTN, T2DM, PBC (on ursodeoxycholic acid) and breast cancer (post-bilateral mastectomy) presents with one day episode of black and tarry stool and two episodes of hematemesis. Patient seen and examined with her , who is a physician at [facility] (psychiatrist). Patient states this has never happened before. She does not take any anti-platelet or anti-coagulant. No prior history of EGD. Last colonoscopy 11 years ago and per patient unremarkable. She recently went on a tour, but is unsure if she has decreased in exercise tolerance. Currently no shortness of breath or chest pain.    VS: T: 98.2, BP: 133/65, HR: 99, RR: 18, O2Sat: 97% Room Air  No Epigastric tenderness, soft NTND abdomen, does not look grossly pale    Labs/Imaging:  Guaiac Positive  H.3 (last known Hg in 2015 and was 14.5)  HCT: 38, MCV: 103, PLT: 100  PT: 13, K: 3.9, BUN: 24  LFTs unremarkable, Lipase 70  Lactic Acid: 2.2 -> 2.1    CXR reviewed by me: no consolidation    CT A&P with IV contrast: pending full read, but liver doesn’t appear as smooth as I would expect    Assessment/Plan:  At this point patient history suspicious for UGIB given acute episode of hematemesis and melena. Patient hemodynamically stable. Hg has dropped 2 grams from last known in 2015, so cannot say how acute her hemoglobin drop is if at all.     #Melena  -Continue with Protonix drop  -Maintain two large bore PIV  -NPO  -GI emailed by resident  -Gentle hydration  -Serial CBCs, maintain active T&S    #Primary Biliary Cirrhosis  -Continue with Ursodeoxycholic acid  -Follow up CT A&P    #T2DM – reduce basal insulin for 33 to 66% given NPO status, hold pre-prandial insulin, fingerstick q6 hours while NPO    Plan discussed with , patient, and resident. Patient seen and examined at bedside with resident. Below are my findings and assessment:     74F with PMHx of HTN, T2DM, PBC (on ursodeoxycholic acid) and breast cancer (post-bilateral mastectomy) presents with one day episode of black and tarry stool and two episodes of hematemesis. Patient seen and examined with her , who is a physician at [facility] (psychiatrist). Patient states this has never happened before. She does not take any anti-platelet or anti-coagulant. No prior history of EGD. Last colonoscopy 11 years ago and per patient unremarkable. She recently went on a tour, but is unsure if she has decreased in exercise tolerance. Currently no shortness of breath or chest pain.    VS: T: 98.2, BP: 133/65, HR: 99, RR: 18, O2Sat: 97% Room Air  No Epigastric tenderness, soft NTND abdomen, does not look grossly pale    Labs/Imaging:  Guaiac Positive  H.3 (last known Hg in  and was 14.5)  HCT: 38, MCV: 103, PLT: 100  PT: 13, K: 3.9, BUN: 24  LFTs unremarkable, Lipase 70  Lactic Acid: 2.2 -> 2.1    CXR reviewed by me: no consolidation    CT A&P with IV contrast: pending full read, but liver doesn’t appear as smooth as I would expect    Assessment/Plan:  At this point patient history suspicious for UGIB given acute episode of hematemesis and melena. Patient hemodynamically stable. Hg has dropped 2 grams from last known in , so cannot say how acute her hemoglobin drop is if at all.     #Melena  -Continue with Protonix drop  -Maintain two large bore PIV  -NPO  -GI emailed by resident  -Gentle hydration  -Serial CBCs, maintain active T&S    #Primary Biliary Cirrhosis  -Continue with Ursodeoxycholic acid  -Follow up CT A&P    #T2DM – reduce basal insulin for 33 to 66% given NPO status, hold pre-prandial insulin, fingerstick q6 hours while NPO    #Thrombocytopenia - continue to monitor, last known PLT in  was 147, suspect she has a hypoproliferative process    Plan discussed with , patient, and resident. Patient seen and examined at bedside with resident. Below are my findings and assessment:     74F with PMHx of HTN, T2DM, PBC (on ursodeoxycholic acid) and breast cancer (post-bilateral mastectomy) presents with one day episode of black and tarry stool and two episodes of hematemesis. Patient seen and examined with her , who is a physician at [facility] (psychiatrist). Patient states this has never happened before. She does not take any anti-platelet or anti-coagulant. No prior history of EGD. Last colonoscopy 11 years ago and per patient unremarkable. She recently went on a tour, but is unsure if she has decreased in exercise tolerance. Currently no shortness of breath or chest pain.    VS: T: 98.2, BP: 133/65, HR: 99, RR: 18, O2Sat: 97% Room Air  No Epigastric tenderness, soft NTND abdomen, does not look grossly pale    Labs/Imaging:  Guaiac Positive  H.3 (last known Hg in  and was 14.5)  HCT: 38, MCV: 103, PLT: 100  PT: 13, K: 3.9, BUN: 24  LFTs unremarkable, Lipase 70  Lactic Acid: 2.2 -> 2.1    CXR reviewed by me: no consolidation    CT A&P with IV contrast: pending full read, but liver doesn’t appear as smooth as I would expect    Assessment/Plan:  At this point patient history suspicious for UGIB given acute episode of hematemesis and melena. Patient hemodynamically stable. Hg has dropped 2 grams from last known in , so cannot say how acute her hemoglobin drop is if at all.     #Melena  -Continue with Protonix drop  -Maintain two large bore PIV  -NPO  -GI emailed by resident  -Gentle hydration  -Serial CBCs, maintain active T&S    #Primary Biliary Cirrhosis  -Continue with Ursodeoxycholic acid  -Follow up CT A&P, possible cirrhosis which would make varices a possible etiology of bleed    #T2DM – reduce basal insulin for 33 to 66% given NPO status, hold pre-prandial insulin, fingerstick q6 hours while NPO    #Thrombocytopenia - continue to monitor, last known PLT in  was 147, suspect she has a hypoproliferative process    Plan discussed with , patient, and resident.

## 2019-09-27 NOTE — H&P ADULT - NSHPLABSRESULTS_GEN_ALL_CORE
LABS: Personally reviewed imaging, labs, and ECG                          12.3   6.0   )-----------( 100      ( 26 Sep 2019 19:36 )             38.0       09-26    140  |  102  |  24<H>  ----------------------------<  237<H>  3.9   |  28  |  0.58    Ca    9.7      26 Sep 2019 19:36    TPro  7.2  /  Alb  3.7  /  TBili  0.7  /  DBili  x   /  AST  37  /  ALT  32  /  AlkPhos  130<H>  09-26        PT/INR - ( 26 Sep 2019 19:36 )   PT: 13.0 sec;   INR: 1.13 ratio    PTT - ( 26 Sep 2019 19:36 )  PTT:29.2 sec      RADIOLOGY & ADDITIONAL TESTS:    < from: Xray Chest 1 View AP/PA (09.26.19 @ 21:56) >    ******PRELIMINARY REPORT******          INTERPRETATION:  no emergent findings    ******PRELIMINARY REPORT******      < end of copied text >

## 2019-09-27 NOTE — H&P ADULT - NSHPPHYSICALEXAM_GEN_ALL_CORE
Vital Signs Last 24 Hrs  T(C): 36.8 (27 Sep 2019 03:10), Max: 36.9 (26 Sep 2019 18:59)  T(F): 98.2 (27 Sep 2019 03:10), Max: 98.4 (26 Sep 2019 18:59)  HR: 99 (27 Sep 2019 03:10) (93 - 99)  BP: 133/65 (27 Sep 2019 03:10) (133/65 - 161/70)  BP(mean): --  RR: 18 (27 Sep 2019 03:10) (18 - 18)  SpO2: 97% (27 Sep 2019 03:10) (96% - 98%)    Physical Exam:  Gen: Alert, NAD  HEENT: NCAT, PERRL, EOMI, clear conjunctiva, no scleral icterus, no erythema or exudates in the oropharynx, mmm  Neck: Supple, no JVD, no LAD  CV: RRR, S1S2, no m/r/g  Resp: CTAB, normal respiratory effort  Abd: Soft, NT, ND, normal bowel sounds  Ext: + peripheral pulses, no clubbing, cyanosis or edema  Neuro: AOx3, CN2-12 grossly intact, no focal deficits, KRUGER  Skin: no rashes, ecchymoses, petechiae

## 2019-09-27 NOTE — H&P ADULT - NSHPSOCIALHISTORY_GEN_ALL_CORE
Lives at home with , neversmoker, infrequent etoh on special occasions, no other drug use Lives at home with , never smoker, infrequent etoh on special occasions, no other drug use

## 2019-09-27 NOTE — H&P ADULT - PROBLEM SELECTOR PLAN 3
On lantus 12 qhs at home  - c/w DEBRA q6 while NPO  - monitor FS On lantus 12 qhs at home  - c/w lantus 6 and DEBRA q6 while NPO  - monitor FS

## 2019-09-27 NOTE — H&P ADULT - PROBLEM SELECTOR PLAN 1
New onset acute bright red hematemesis x3 and melanotic stool x1 tonight concerning for UGIB presumably 2/2 PBC. FOBT+. No prior EGD. Not known coagulopathy. Not on AC.  - Hb 12.3 from baseline 14-15  - HD stable  - s/p 2L IVF  - has two 20gauge PIV  - c/w protonix gtt  - NPO  - CBC q6 for now, maintain active T&S, transfuse for Hb <7  - GI c/s placed New onset acute bright red hematemesis x3 and melanotic stool x1 tonight concerning for UGIB presumably 2/2 PBC. FOBT+. No prior EGD. Not known coagulopathy. Not on AC.  - Hb 12.3 from baseline 14-15. BUN 24.  - HD stable  - s/p 2L IVF  - has two 20gauge PIV  - c/w protonix gtt  - NPO  - CBC q6 for now, maintain active T&S, transfuse for Hb <7  - GI c/s placed New onset acute bright red hematemesis x3 and melanotic stool x1 tonight concerning for UGIB presumably 2/2 PBC. FOBT+. No prior EGD. Not known coagulopathy. Not on AC.  - Hb 12.3 from baseline 14-15. BUN 24.  - HD stable  - s/p 2L IVF and protonix 80 IV  - has two 20gauge PIV  - c/w protonix gtt  - NPO  - CBC q6 for now, maintain active T&S, transfuse for Hb <7  - GI c/s placed

## 2019-09-27 NOTE — CONSULT NOTE ADULT - ASSESSMENT
75 y/o F w/ hx of primary biliary cholangitis presenting with hematemesis and melena.    Impression:  # Acute blood loss anemia with hematemesis and melena: Concern for variceal bleeding in setting of primary biliary cholangitis, however, no reported history of cirrhosis or portal hypertension. Differential includes PUD and Dieulafoy's lesion. Currently with melena, HD stable, and with Hgb of 10 down from 12 upon presentation.  # Primary biliary cholangitis: On ursodiol. Unclear if patient has cirrhosis.  # Concern for cirrhosis: Presumably secondary to primary biliary cholangitis  Varices: No prior EGD  Ascites: No prior imaging  HE: AAO x 3  HCC: No prior imaging  MELD-Na: 8   # DM    Recommendations:  - Plan for upper endoscopy today  - NPO  - Monitor CBCs q8h  - Monitor daily CMP and INR  - Monitor bowel movements  - Transfuse for goal Hgb = 7.0 to 8.0  - Continue pantoprazole infusion at 8 mg/hr  - Octreotide 50 mcg bolus then 50 mcg/hr for 5 days  - Ceftriaxone 1g q24h for 7 days  - Check abdominal U/S w/ dopplers  - Two large bore IVs  - Maintain active type and screen  - Rest of care per primary team    Juan Antonio Carpenter MD  Gastroenterology Fellow  Pager number: [phone] / [phone]

## 2019-09-27 NOTE — H&P ADULT - NSHPREVIEWOFSYSTEMS_GEN_ALL_CORE
General: No fevers, chills  HEENT: No headaches, rhinorrhea, sore throat  CVS: No chest pain, palpitations  Resp: No cough, dyspnea  GI: + hematemesis and melena; No abdominal pain, nausea, constipation, diarrhea  : No dysuria, frequency, hematuria  MSK: No joint pain or swelling  Ext: No edema  Skin: No rashes or itching  Heme: No easy bruising or petechiae  Neuro: No confusion, numbness, weakness, or loss of consciousness  Endocrine: No excessive heat or cold symptoms, polyuria, polydipsia General: No fevers, chills  HEENT: No headaches, rhinorrhea, sore throat  CVS: No chest pain, palpitations  Resp: No cough, dyspnea  GI: + hematemesis and melena; No abdominal pain, nausea, constipation, diarrhea  : No dysuria, frequency, hematuria  MSK: No joint pain or swelling  Ext: No edema, no extremity pain  Skin: No rashes or itching  Heme: No easy bruising or petechiae  Neuro: No confusion, numbness, weakness, or loss of consciousness  Endocrine: No excessive heat or cold symptoms, polyuria, polydipsia

## 2019-09-28 LAB
HCT VFR BLD CALC: 31.4 % — LOW (ref 34.5–45)
HCT VFR BLD CALC: 34.5 % — SIGNIFICANT CHANGE UP (ref 34.5–45)
HCT VFR BLD CALC: 35.1 % — SIGNIFICANT CHANGE UP (ref 34.5–45)
HCV AB S/CO SERPL IA: 0.11 S/CO — SIGNIFICANT CHANGE UP (ref 0–0.99)
HCV AB SERPL-IMP: SIGNIFICANT CHANGE UP
HGB BLD-MCNC: 10.4 G/DL — LOW (ref 11.5–15.5)
HGB BLD-MCNC: 11.1 G/DL — LOW (ref 11.5–15.5)
HGB BLD-MCNC: 11.7 G/DL — SIGNIFICANT CHANGE UP (ref 11.5–15.5)
MCHC RBC-ENTMCNC: 31.5 GM/DL — LOW (ref 32–36)
MCHC RBC-ENTMCNC: 32.5 PG — SIGNIFICANT CHANGE UP (ref 27–34)
MCHC RBC-ENTMCNC: 33 GM/DL — SIGNIFICANT CHANGE UP (ref 32–36)
MCHC RBC-ENTMCNC: 34 GM/DL — SIGNIFICANT CHANGE UP (ref 32–36)
MCHC RBC-ENTMCNC: 34 PG — SIGNIFICANT CHANGE UP (ref 27–34)
MCHC RBC-ENTMCNC: 35.1 PG — HIGH (ref 27–34)
MCV RBC AUTO: 103 FL — HIGH (ref 80–100)
PLATELET # BLD AUTO: 77 K/UL — LOW (ref 150–400)
PLATELET # BLD AUTO: 83 K/UL — LOW (ref 150–400)
PLATELET # BLD AUTO: 90 K/UL — LOW (ref 150–400)
RBC # BLD: 3.05 M/UL — LOW (ref 3.8–5.2)
RBC # BLD: 3.34 M/UL — LOW (ref 3.8–5.2)
RBC # BLD: 3.4 M/UL — LOW (ref 3.8–5.2)
RBC # FLD: 12.2 % — SIGNIFICANT CHANGE UP (ref 10.3–14.5)
RBC # FLD: 12.5 % — SIGNIFICANT CHANGE UP (ref 10.3–14.5)
RBC # FLD: 12.6 % — SIGNIFICANT CHANGE UP (ref 10.3–14.5)
WBC # BLD: 3.9 K/UL — SIGNIFICANT CHANGE UP (ref 3.8–10.5)
WBC # BLD: 4.3 K/UL — SIGNIFICANT CHANGE UP (ref 3.8–10.5)
WBC # BLD: 4.9 K/UL — SIGNIFICANT CHANGE UP (ref 3.8–10.5)
WBC # FLD AUTO: 3.9 K/UL — SIGNIFICANT CHANGE UP (ref 3.8–10.5)
WBC # FLD AUTO: 4.3 K/UL — SIGNIFICANT CHANGE UP (ref 3.8–10.5)
WBC # FLD AUTO: 4.9 K/UL — SIGNIFICANT CHANGE UP (ref 3.8–10.5)

## 2019-09-28 PROCEDURE — 99232 SBSQ HOSP IP/OBS MODERATE 35: CPT

## 2019-09-28 PROCEDURE — 99233 SBSQ HOSP IP/OBS HIGH 50: CPT | Mod: GC

## 2019-09-28 RX ORDER — IBUPROFEN 200 MG
600 TABLET ORAL ONCE
Refills: 0 | Status: DISCONTINUED | OUTPATIENT
Start: 2019-09-28 | End: 2019-09-30

## 2019-09-28 RX ORDER — SUCRALFATE 1 G
1 TABLET ORAL EVERY 6 HOURS
Refills: 0 | Status: DISCONTINUED | OUTPATIENT
Start: 2019-09-28 | End: 2019-10-01

## 2019-09-28 RX ORDER — INSULIN GLARGINE 100 [IU]/ML
12 INJECTION, SOLUTION SUBCUTANEOUS AT BEDTIME
Refills: 0 | Status: DISCONTINUED | OUTPATIENT
Start: 2019-09-28 | End: 2019-10-01

## 2019-09-28 RX ORDER — PANTOPRAZOLE SODIUM 20 MG/1
40 TABLET, DELAYED RELEASE ORAL
Refills: 0 | Status: DISCONTINUED | OUTPATIENT
Start: 2019-09-28 | End: 2019-09-30

## 2019-09-28 RX ADMIN — OCTREOTIDE ACETATE 10 MICROGRAM(S)/HR: 200 INJECTION, SOLUTION INTRAVENOUS; SUBCUTANEOUS at 12:39

## 2019-09-28 RX ADMIN — Medication 1 GRAM(S): at 18:00

## 2019-09-28 RX ADMIN — SODIUM CHLORIDE 50 MILLILITER(S): 9 INJECTION INTRAMUSCULAR; INTRAVENOUS; SUBCUTANEOUS at 18:02

## 2019-09-28 RX ADMIN — SODIUM CHLORIDE 50 MILLILITER(S): 9 INJECTION INTRAMUSCULAR; INTRAVENOUS; SUBCUTANEOUS at 21:32

## 2019-09-28 RX ADMIN — Medication 1: at 23:59

## 2019-09-28 RX ADMIN — SODIUM CHLORIDE 50 MILLILITER(S): 9 INJECTION INTRAMUSCULAR; INTRAVENOUS; SUBCUTANEOUS at 00:30

## 2019-09-28 RX ADMIN — INSULIN GLARGINE 12 UNIT(S): 100 INJECTION, SOLUTION SUBCUTANEOUS at 21:32

## 2019-09-28 RX ADMIN — OCTREOTIDE ACETATE 10 MICROGRAM(S)/HR: 200 INJECTION, SOLUTION INTRAVENOUS; SUBCUTANEOUS at 00:30

## 2019-09-28 RX ADMIN — CEFTRIAXONE 100 MILLIGRAM(S): 500 INJECTION, POWDER, FOR SOLUTION INTRAMUSCULAR; INTRAVENOUS at 14:51

## 2019-09-28 RX ADMIN — Medication 3: at 12:50

## 2019-09-28 RX ADMIN — Medication 1 GRAM(S): at 23:58

## 2019-09-28 RX ADMIN — SODIUM CHLORIDE 50 MILLILITER(S): 9 INJECTION INTRAMUSCULAR; INTRAVENOUS; SUBCUTANEOUS at 07:32

## 2019-09-28 RX ADMIN — SERTRALINE 25 MILLIGRAM(S): 25 TABLET, FILM COATED ORAL at 12:52

## 2019-09-28 RX ADMIN — URSODIOL 250 MILLIGRAM(S): 250 TABLET, FILM COATED ORAL at 21:31

## 2019-09-28 RX ADMIN — OCTREOTIDE ACETATE 10 MICROGRAM(S)/HR: 200 INJECTION, SOLUTION INTRAVENOUS; SUBCUTANEOUS at 18:02

## 2019-09-28 RX ADMIN — OCTREOTIDE ACETATE 10 MICROGRAM(S)/HR: 200 INJECTION, SOLUTION INTRAVENOUS; SUBCUTANEOUS at 07:32

## 2019-09-28 RX ADMIN — URSODIOL 250 MILLIGRAM(S): 250 TABLET, FILM COATED ORAL at 05:35

## 2019-09-28 RX ADMIN — URSODIOL 250 MILLIGRAM(S): 250 TABLET, FILM COATED ORAL at 14:48

## 2019-09-28 NOTE — PROGRESS NOTE ADULT - ASSESSMENT
74 year old female with PMH HTN, DM, PBC, breast cancer s/p b/l mastectomy, presents with new acute hematemesis and melena admitted for upper GI bleeding s/p EGD with banding, now on ceftriaxone drip and octreotide drip.

## 2019-09-28 NOTE — PROGRESS NOTE ADULT - ASSESSMENT
75 y/o F w/ hx of primary biliary cholangitis presenting with hematemesis and melena.    Impression:  # Acute blood loss anemia with hematemesis and melena: EGD s/p banding on 9/27 with complete eradication  # Primary biliary cholangitis: On ursodiol. Unclear if patient has cirrhosis.  Varices: s/p EGd with banding 9/27  Ascites: none  HE: AAO x 3  HCC: indeterminate lesion from 9/26, needs MRI  MELD-Na: 8   # DM    Recommendations:  - continue with octreotide gtt  - orall PPI daily  - carafate 10mg q6hr  - Monitor CBCs q12h  - Monitor daily CMP and INR  - Monitor bowel movements  - Transfuse for goal Hgb = 7.0 to 8.0  - Ceftriaxone 1g q24h for 7 days  - Two large bore IVs  - Maintain active type and screen  - Rest of care per primary team

## 2019-09-28 NOTE — PROGRESS NOTE ADULT - ATTENDING COMMENTS
Hepatology Staff: Christina Willis MD  I saw and examined the patient along with Dr. Marley on 9/28/2019 .     Patient with PBC with cirrhosis admitted with upper GI bleeding. s/p EGD with banding (9/27). Stable H/H, no further GI bleeding. Keep on IV Octreotide. Initiate PPI orally once daily.   Initiate Carafate suspension 10 ml PO q 6hrsly.  OK advance to GI soft diet.

## 2019-09-29 LAB
ANION GAP SERPL CALC-SCNC: 8 MMOL/L — SIGNIFICANT CHANGE UP (ref 5–17)
BUN SERPL-MCNC: 8 MG/DL — SIGNIFICANT CHANGE UP (ref 7–23)
CALCIUM SERPL-MCNC: 8.6 MG/DL — SIGNIFICANT CHANGE UP (ref 8.4–10.5)
CHLORIDE SERPL-SCNC: 106 MMOL/L — SIGNIFICANT CHANGE UP (ref 96–108)
CO2 SERPL-SCNC: 27 MMOL/L — SIGNIFICANT CHANGE UP (ref 22–31)
CREAT SERPL-MCNC: 0.54 MG/DL — SIGNIFICANT CHANGE UP (ref 0.5–1.3)
GLUCOSE SERPL-MCNC: 136 MG/DL — HIGH (ref 70–99)
HCT VFR BLD CALC: 30.1 % — LOW (ref 34.5–45)
HCT VFR BLD CALC: 30.4 % — LOW (ref 34.5–45)
HCT VFR BLD CALC: 30.7 % — LOW (ref 34.5–45)
HGB BLD-MCNC: 10 G/DL — LOW (ref 11.5–15.5)
HGB BLD-MCNC: 10 G/DL — LOW (ref 11.5–15.5)
HGB BLD-MCNC: 10.5 G/DL — LOW (ref 11.5–15.5)
MCHC RBC-ENTMCNC: 32.7 GM/DL — SIGNIFICANT CHANGE UP (ref 32–36)
MCHC RBC-ENTMCNC: 33.1 GM/DL — SIGNIFICANT CHANGE UP (ref 32–36)
MCHC RBC-ENTMCNC: 34.1 PG — HIGH (ref 27–34)
MCHC RBC-ENTMCNC: 34.2 PG — HIGH (ref 27–34)
MCHC RBC-ENTMCNC: 34.5 GM/DL — SIGNIFICANT CHANGE UP (ref 32–36)
MCHC RBC-ENTMCNC: 35.6 PG — HIGH (ref 27–34)
MCV RBC AUTO: 103 FL — HIGH (ref 80–100)
MCV RBC AUTO: 103 FL — HIGH (ref 80–100)
MCV RBC AUTO: 104 FL — HIGH (ref 80–100)
PLATELET # BLD AUTO: 78 K/UL — LOW (ref 150–400)
PLATELET # BLD AUTO: 82 K/UL — LOW (ref 150–400)
PLATELET # BLD AUTO: 90 K/UL — LOW (ref 150–400)
POTASSIUM SERPL-MCNC: 4 MMOL/L — SIGNIFICANT CHANGE UP (ref 3.5–5.3)
POTASSIUM SERPL-SCNC: 4 MMOL/L — SIGNIFICANT CHANGE UP (ref 3.5–5.3)
RBC # BLD: 2.91 M/UL — LOW (ref 3.8–5.2)
RBC # BLD: 2.94 M/UL — LOW (ref 3.8–5.2)
RBC # BLD: 2.95 M/UL — LOW (ref 3.8–5.2)
RBC # FLD: 12.1 % — SIGNIFICANT CHANGE UP (ref 10.3–14.5)
RBC # FLD: 12.3 % — SIGNIFICANT CHANGE UP (ref 10.3–14.5)
RBC # FLD: 12.4 % — SIGNIFICANT CHANGE UP (ref 10.3–14.5)
SODIUM SERPL-SCNC: 141 MMOL/L — SIGNIFICANT CHANGE UP (ref 135–145)
WBC # BLD: 4.7 K/UL — SIGNIFICANT CHANGE UP (ref 3.8–10.5)
WBC # BLD: 4.7 K/UL — SIGNIFICANT CHANGE UP (ref 3.8–10.5)
WBC # BLD: 5.1 K/UL — SIGNIFICANT CHANGE UP (ref 3.8–10.5)
WBC # FLD AUTO: 4.7 K/UL — SIGNIFICANT CHANGE UP (ref 3.8–10.5)
WBC # FLD AUTO: 4.7 K/UL — SIGNIFICANT CHANGE UP (ref 3.8–10.5)
WBC # FLD AUTO: 5.1 K/UL — SIGNIFICANT CHANGE UP (ref 3.8–10.5)

## 2019-09-29 PROCEDURE — 99232 SBSQ HOSP IP/OBS MODERATE 35: CPT

## 2019-09-29 RX ADMIN — URSODIOL 250 MILLIGRAM(S): 250 TABLET, FILM COATED ORAL at 06:26

## 2019-09-29 RX ADMIN — Medication 1 GRAM(S): at 17:42

## 2019-09-29 RX ADMIN — Medication 1: at 12:45

## 2019-09-29 RX ADMIN — Medication 1: at 23:48

## 2019-09-29 RX ADMIN — SODIUM CHLORIDE 50 MILLILITER(S): 9 INJECTION INTRAMUSCULAR; INTRAVENOUS; SUBCUTANEOUS at 17:43

## 2019-09-29 RX ADMIN — OCTREOTIDE ACETATE 10 MICROGRAM(S)/HR: 200 INJECTION, SOLUTION INTRAVENOUS; SUBCUTANEOUS at 17:45

## 2019-09-29 RX ADMIN — OCTREOTIDE ACETATE 10 MICROGRAM(S)/HR: 200 INJECTION, SOLUTION INTRAVENOUS; SUBCUTANEOUS at 07:50

## 2019-09-29 RX ADMIN — SODIUM CHLORIDE 50 MILLILITER(S): 9 INJECTION INTRAMUSCULAR; INTRAVENOUS; SUBCUTANEOUS at 07:50

## 2019-09-29 RX ADMIN — Medication 1 GRAM(S): at 06:26

## 2019-09-29 RX ADMIN — URSODIOL 250 MILLIGRAM(S): 250 TABLET, FILM COATED ORAL at 14:16

## 2019-09-29 RX ADMIN — URSODIOL 250 MILLIGRAM(S): 250 TABLET, FILM COATED ORAL at 22:10

## 2019-09-29 RX ADMIN — Medication 1 GRAM(S): at 12:21

## 2019-09-29 RX ADMIN — INSULIN GLARGINE 12 UNIT(S): 100 INJECTION, SOLUTION SUBCUTANEOUS at 22:10

## 2019-09-29 RX ADMIN — OCTREOTIDE ACETATE 10 MICROGRAM(S)/HR: 200 INJECTION, SOLUTION INTRAVENOUS; SUBCUTANEOUS at 18:20

## 2019-09-29 RX ADMIN — PANTOPRAZOLE SODIUM 40 MILLIGRAM(S): 20 TABLET, DELAYED RELEASE ORAL at 06:26

## 2019-09-29 RX ADMIN — OCTREOTIDE ACETATE 10 MICROGRAM(S)/HR: 200 INJECTION, SOLUTION INTRAVENOUS; SUBCUTANEOUS at 23:50

## 2019-09-29 RX ADMIN — SODIUM CHLORIDE 50 MILLILITER(S): 9 INJECTION INTRAMUSCULAR; INTRAVENOUS; SUBCUTANEOUS at 06:28

## 2019-09-29 RX ADMIN — OCTREOTIDE ACETATE 10 MICROGRAM(S)/HR: 200 INJECTION, SOLUTION INTRAVENOUS; SUBCUTANEOUS at 06:28

## 2019-09-29 RX ADMIN — SERTRALINE 25 MILLIGRAM(S): 25 TABLET, FILM COATED ORAL at 12:21

## 2019-09-29 RX ADMIN — CEFTRIAXONE 100 MILLIGRAM(S): 500 INJECTION, POWDER, FOR SOLUTION INTRAMUSCULAR; INTRAVENOUS at 15:00

## 2019-09-29 RX ADMIN — Medication 1 GRAM(S): at 23:48

## 2019-09-29 RX ADMIN — SODIUM CHLORIDE 50 MILLILITER(S): 9 INJECTION INTRAMUSCULAR; INTRAVENOUS; SUBCUTANEOUS at 23:50

## 2019-09-29 NOTE — PROVIDER CONTACT NOTE (OTHER) - ACTION/TREATMENT ORDERED:
COntinue to monitor pt for hypoglycemia, start hypoglycemic protocol
Follow Policy/protocol and continue to monitor.

## 2019-09-29 NOTE — PROVIDER CONTACT NOTE (OTHER) - ASSESSMENT
Patient is A&Ox4, asymptomatic and not in distress.
Pt is A&Ox4, drinking apple juice, pt had been hypoglycemic yesterday morning

## 2019-09-29 NOTE — PROVIDER CONTACT NOTE (OTHER) - BACKGROUND
Patient admitted for Hematemesis, with coffee ground stool. Hx of Breast CA.
Pt has PMH of breast cancer, panic disorder, HTN, DM2

## 2019-09-30 ENCOUNTER — TRANSCRIPTION ENCOUNTER (OUTPATIENT)
Age: 74
End: 2019-09-30

## 2019-09-30 DIAGNOSIS — E11.9 TYPE 2 DIABETES MELLITUS WITHOUT COMPLICATIONS: ICD-10-CM

## 2019-09-30 DIAGNOSIS — D62 ACUTE POSTHEMORRHAGIC ANEMIA: ICD-10-CM

## 2019-09-30 LAB
HBA1C BLD-MCNC: 6.8 % — HIGH (ref 4–5.6)
HCT VFR BLD CALC: 30.2 % — LOW (ref 34.5–45)
HCT VFR BLD CALC: 30.4 % — LOW (ref 34.5–45)
HCT VFR BLD CALC: 30.5 % — LOW (ref 34.5–45)
HGB BLD-MCNC: 10 G/DL — LOW (ref 11.5–15.5)
HGB BLD-MCNC: 10.2 G/DL — LOW (ref 11.5–15.5)
HGB BLD-MCNC: 10.4 G/DL — LOW (ref 11.5–15.5)
MCHC RBC-ENTMCNC: 32.8 GM/DL — SIGNIFICANT CHANGE UP (ref 32–36)
MCHC RBC-ENTMCNC: 33.4 GM/DL — SIGNIFICANT CHANGE UP (ref 32–36)
MCHC RBC-ENTMCNC: 33.9 PG — SIGNIFICANT CHANGE UP (ref 27–34)
MCHC RBC-ENTMCNC: 34.5 GM/DL — SIGNIFICANT CHANGE UP (ref 32–36)
MCHC RBC-ENTMCNC: 34.7 PG — HIGH (ref 27–34)
MCHC RBC-ENTMCNC: 35.6 PG — HIGH (ref 27–34)
MCV RBC AUTO: 103 FL — HIGH (ref 80–100)
MCV RBC AUTO: 103 FL — HIGH (ref 80–100)
MCV RBC AUTO: 104 FL — HIGH (ref 80–100)
PLATELET # BLD AUTO: 79 K/UL — LOW (ref 150–400)
PLATELET # BLD AUTO: 90 K/UL — LOW (ref 150–400)
PLATELET # BLD AUTO: 97 K/UL — LOW (ref 150–400)
RBC # BLD: 2.92 M/UL — LOW (ref 3.8–5.2)
RBC # BLD: 2.93 M/UL — LOW (ref 3.8–5.2)
RBC # BLD: 2.94 M/UL — LOW (ref 3.8–5.2)
RBC # FLD: 12.5 % — SIGNIFICANT CHANGE UP (ref 10.3–14.5)
RBC # FLD: 12.6 % — SIGNIFICANT CHANGE UP (ref 10.3–14.5)
RBC # FLD: 12.7 % — SIGNIFICANT CHANGE UP (ref 10.3–14.5)
WBC # BLD: 3.9 K/UL — SIGNIFICANT CHANGE UP (ref 3.8–10.5)
WBC # BLD: 3.9 K/UL — SIGNIFICANT CHANGE UP (ref 3.8–10.5)
WBC # BLD: 5.5 K/UL — SIGNIFICANT CHANGE UP (ref 3.8–10.5)
WBC # FLD AUTO: 3.9 K/UL — SIGNIFICANT CHANGE UP (ref 3.8–10.5)
WBC # FLD AUTO: 3.9 K/UL — SIGNIFICANT CHANGE UP (ref 3.8–10.5)
WBC # FLD AUTO: 5.5 K/UL — SIGNIFICANT CHANGE UP (ref 3.8–10.5)

## 2019-09-30 PROCEDURE — 99233 SBSQ HOSP IP/OBS HIGH 50: CPT

## 2019-09-30 PROCEDURE — 99233 SBSQ HOSP IP/OBS HIGH 50: CPT | Mod: GC

## 2019-09-30 RX ORDER — INSULIN LISPRO 100/ML
VIAL (ML) SUBCUTANEOUS AT BEDTIME
Refills: 0 | Status: DISCONTINUED | OUTPATIENT
Start: 2019-09-30 | End: 2019-10-01

## 2019-09-30 RX ORDER — INSULIN LISPRO 100/ML
VIAL (ML) SUBCUTANEOUS
Refills: 0 | Status: DISCONTINUED | OUTPATIENT
Start: 2019-09-30 | End: 2019-10-01

## 2019-09-30 RX ADMIN — OCTREOTIDE ACETATE 10 MICROGRAM(S)/HR: 200 INJECTION, SOLUTION INTRAVENOUS; SUBCUTANEOUS at 08:37

## 2019-09-30 RX ADMIN — Medication 1 GRAM(S): at 18:21

## 2019-09-30 RX ADMIN — Medication 1 GRAM(S): at 12:04

## 2019-09-30 RX ADMIN — Medication 1: at 12:59

## 2019-09-30 RX ADMIN — PANTOPRAZOLE SODIUM 40 MILLIGRAM(S): 20 TABLET, DELAYED RELEASE ORAL at 05:16

## 2019-09-30 RX ADMIN — URSODIOL 250 MILLIGRAM(S): 250 TABLET, FILM COATED ORAL at 05:15

## 2019-09-30 RX ADMIN — OCTREOTIDE ACETATE 10 MICROGRAM(S)/HR: 200 INJECTION, SOLUTION INTRAVENOUS; SUBCUTANEOUS at 18:20

## 2019-09-30 RX ADMIN — Medication 1 GRAM(S): at 05:15

## 2019-09-30 RX ADMIN — SODIUM CHLORIDE 50 MILLILITER(S): 9 INJECTION INTRAMUSCULAR; INTRAVENOUS; SUBCUTANEOUS at 08:37

## 2019-09-30 RX ADMIN — SERTRALINE 25 MILLIGRAM(S): 25 TABLET, FILM COATED ORAL at 12:04

## 2019-09-30 RX ADMIN — OCTREOTIDE ACETATE 10 MICROGRAM(S)/HR: 200 INJECTION, SOLUTION INTRAVENOUS; SUBCUTANEOUS at 22:02

## 2019-09-30 RX ADMIN — INSULIN GLARGINE 12 UNIT(S): 100 INJECTION, SOLUTION SUBCUTANEOUS at 22:08

## 2019-09-30 RX ADMIN — SODIUM CHLORIDE 50 MILLILITER(S): 9 INJECTION INTRAMUSCULAR; INTRAVENOUS; SUBCUTANEOUS at 22:02

## 2019-09-30 RX ADMIN — URSODIOL 250 MILLIGRAM(S): 250 TABLET, FILM COATED ORAL at 13:01

## 2019-09-30 RX ADMIN — URSODIOL 250 MILLIGRAM(S): 250 TABLET, FILM COATED ORAL at 22:03

## 2019-09-30 RX ADMIN — CEFTRIAXONE 100 MILLIGRAM(S): 500 INJECTION, POWDER, FOR SOLUTION INTRAMUSCULAR; INTRAVENOUS at 15:39

## 2019-09-30 NOTE — DIETITIAN INITIAL EVALUATION ADULT. - ENERGY NEEDS
Pertinent information as per chart: Pt 73 y/o F with PMH: HTN, DM, primary biliary cirrhosis on ursodeoxycholic acid, breast cancer S/P mastectomy, presenting after an episode of hematemesis and black stool, admitted for upper GI bleeding, found with esophageal varices S/P EGD with banding (09/27). Pt on mechanical soft diet as per team recommendations.

## 2019-09-30 NOTE — CHART NOTE - NSCHARTNOTEFT_GEN_A_CORE
Patient seen and examined at bedside.   Comfortable appearing. No further bleeding episodes overnight.   Abdomen is soft, non-tender.   Awaiting EGD today.   Keep NPO.   Started on ceftriaxone and octreotide.  F/U abd ultrasound with dopplers.  Monitor CBC q 8 HRS.
Upon Nutritional Assessment by the Registered Dietitian your patient was determined to meet criteria / has evidence of the following diagnosis/diagnoses:          [x]  Mild Protein Calorie Malnutrition        [ ]  Moderate Protein Calorie Malnutrition        [ ] Severe Protein Calorie Malnutrition        [ ] Unspecified Protein Calorie Malnutrition        [ ] Underweight / BMI <19        [ ] Morbid Obesity / BMI > 40      Findings as based on:  [x] Comprehensive nutrition assessment   [ ] Nutrition Focused Physical Exam  [x] Other: PO intake <75% and 3.5 % weight loss x 4 months PTA.       Nutrition Plan/Recommendations:    1. Recommend change to mechanical soft + Consistent Carbohydrate with snack + low salt diet to allow pt more food options. Defer diet/fluid consistencies to medical team/SLP recommendations (consider advancing to regular texture if feasible).  2. Recommend Glucerna Shake 240mls 3x daily (660kcals, 30g protein) to optimize kcal and protein intake.   3. Encourage PO intake and obtain food preferences (obtained at this time).   4. Provided recommendations to optimize PO and protein intake and education on DM and heart healthy nutrition therapy.     RD remains available,   Brinda Rosa MS, RDN, #[phone]     PROVIDER Section:     By signing this assessment you are acknowledging and agree with the diagnosis/diagnoses assigned by the Registered Dietitian    Comments:

## 2019-09-30 NOTE — DIETITIAN INITIAL EVALUATION ADULT. - PHYSICAL APPEARANCE
Performed nutrition focused physical exam with pt's consent and noted no signs of muscle/fat loss in any area./other (specify)/well nourished Ht: 59 inches (stated per pt) Wt: 108 pounds BMI: 21.8 kg/m2 IBW: 95 (+/-10%) 113.6 %IBW  No noted edema as per flow sheets.   Skin: no noted pressure injuries as per documentation.

## 2019-09-30 NOTE — DISCHARGE NOTE PROVIDER - HOSPITAL COURSE
74 year old female with PMH HTN, DM, PBC, breast cancer s/p b/l mastectomy, presents with new acute hematemesis and melena admitted for upper GI bleeding. s/p EGD on 9/27/19 with esophageal varices with recent stigmata of bleeding with banding. Received 5 days of ceftriaxone and octreotide per hepatology recs. MRI abdomen w/ IV contrast to further evaluate indeterminate right lobe liver lesion - can obtain as outpatient. Repeat upper endoscopy in 4 weeks for repeat banding if needed. 74 year old female with PMH HTN, DM, PBC, breast cancer s/p b/l mastectomy, presents with new acute hematemesis and melena admitted for upper GI bleeding. s/p EGD on 9/27/19 with esophageal varices with recent stigmata of bleeding with banding. Received 5 days of ceftriaxone and octreotide per hepatology recs. MRI abdomen w/ IV contrast to further evaluate indeterminate right lobe liver lesion - can obtain as outpatient. Repeat upper endoscopy in 4 weeks for repeat banding if needed.     She was seen by Hepatology.

## 2019-09-30 NOTE — DISCHARGE NOTE NURSING/CASE MANAGEMENT/SOCIAL WORK - PATIENT PORTAL LINK FT
You can access the FollowMyHealth Patient Portal offered by [facility] by registering at the following website: [email or web address]. By joining "[employer]’s FollowMyHealth portal, you will also be able to view your health information using other applications (apps) compatible with our system.

## 2019-09-30 NOTE — DISCHARGE NOTE NURSING/CASE MANAGEMENT/SOCIAL WORK - NSDCPEWEB_GEN_ALL_CORE
[place] website --- [email or web address] for Tobacco Control website --- [email or web address]

## 2019-09-30 NOTE — DIETITIAN INITIAL EVALUATION ADULT. - REASON INDICATOR FOR ASSESSMENT
Nutrition consult received for DM/hypoglycemia episode.  Information obtained from: medical record and pt.   Pt Tagalog-Speaking as per chart - refused  phone, understands and speaks English well.

## 2019-09-30 NOTE — DISCHARGE NOTE PROVIDER - PROVIDER TOKENS
PROVIDER:[TOKEN:[[id number]]] PROVIDER:[TOKEN:[[id number]]],PROVIDER:[TOKEN:[[id number]]]

## 2019-09-30 NOTE — DIETITIAN INITIAL EVALUATION ADULT. - DIET TYPE
Mechanical soft + Consistent Carbohydrate with snack + DASH/TLC 1. Recommend change to mechanical soft + Consistent Carbohydrate with snack + low salt diet to allow pt more food options. Defer diet/fluid consistencies to medical team/SLP recommendations (consider advancing to regular texture if feasible). 2. Recommend Glucerna Shake 240mls 3x daily (660kcals, 30g protein) to optimize kcal and protein intake. Discussed diet and supplements with NP.

## 2019-09-30 NOTE — DISCHARGE NOTE PROVIDER - NSDCCPCAREPLAN_GEN_ALL_CORE_FT
PRINCIPAL DISCHARGE DIAGNOSIS  Diagnosis: UGIB (upper gastrointestinal bleed)  Assessment and Plan of Treatment: There are 2 common types of GI Bleed, Upper GI Bleed and Lower GI Bleed.  Upper GI Bleed affects the esophagus, stomach, and first part of the small intestine. Lower GI Bleed affects the colon and rectum.  Upper GI Bleed signs and symptoms to notify your Health Care Provider are vomiting blood, or coffee ground vomitus, and bowel movements that look like black tar.  Lower GI Bleed signs and symptoms to notify your health care provider are bright red bloody bowel movements.   Take your medications as prescribed by your Gastroenterologist.  If you have had an Endoscopy or Colonoscopy, follow up with your Gastroenterologist for Pathology results.  Avoid NSAIDs unless your Health Care Provider tells you that it is ok (Aspirin, Ibuprofen, Advil, Motrin, Aleve).  Follow up with your Gastroenterologist within 1-2 weeks of discharge.        SECONDARY DISCHARGE DIAGNOSES  Diagnosis: Esophageal varices in cirrhosis  Assessment and Plan of Treatment: No NSAIDS  Continue medications as prescribed  Follow up for repeat upper endoscopy in 4 weeks, Dr. Anaid Morelos ([phone])    Diagnosis: Liver lesion  Assessment and Plan of Treatment: MRI abdomen to further evaluate indeterminate right lobe liver lesion to be done as outpatient PRINCIPAL DISCHARGE DIAGNOSIS  Diagnosis: UGIB (upper gastrointestinal bleed)  Assessment and Plan of Treatment: There are 2 common types of GI Bleed, Upper GI Bleed and Lower GI Bleed.  Upper GI Bleed affects the esophagus, stomach, and first part of the small intestine. Lower GI Bleed affects the colon and rectum.  Upper GI Bleed signs and symptoms to notify your Health Care Provider are vomiting blood, or coffee ground vomitus, and bowel movements that look like black tar.  Lower GI Bleed signs and symptoms to notify your health care provider are bright red bloody bowel movements.   Take your medications as prescribed by your Gastroenterologist.  If you have had an Endoscopy or Colonoscopy, follow up with your Gastroenterologist for Pathology results.  Avoid NSAIDs unless your Health Care Provider tells you that it is ok (Aspirin, Ibuprofen, Advil, Motrin, Aleve).  Follow up with your Gastroenterologist within 1-2 weeks of discharge.  with LUIZ Bland        SECONDARY DISCHARGE DIAGNOSES  Diagnosis: Liver lesion  Assessment and Plan of Treatment: MRI abdomen to further evaluate indeterminate right lobe liver lesion to be done as outpatient    Diagnosis: Esophageal varices in cirrhosis  Assessment and Plan of Treatment: No NSAIDS  Continue medications as prescribed  Follow up for repeat upper endoscopy in 4 weeks, Dr. Anaid Morelos ([phone])

## 2019-09-30 NOTE — DISCHARGE NOTE PROVIDER - CARE PROVIDER_API CALL
Megan Morelos)  Gastroenterology; Internal Medicine  [address] [place] [address]  Phone: [phone]  Fax: [phone]  Follow Up Time: Megan Morelos)  Gastroenterology; Internal Medicine  [address] [place] [address]  Phone: [phone]  Fax: [phone]  Follow Up Time:     Scott Temple)  Internal Medicine  [address] [place] [id number]  Phone: [phone]  Fax: [phone]  Follow Up Time:

## 2019-09-30 NOTE — DIETITIAN INITIAL EVALUATION ADULT. - SIGNS/SYMPTOMS
PO intake <75% and 3.5 % weight loss x 4 months PTA Pt with episodes of hypoglycemia PTA and during hospitalization

## 2019-09-30 NOTE — DIETITIAN INITIAL EVALUATION ADULT. - PROBLEM SELECTOR PLAN 1
New onset acute bright red hematemesis x3 and melanotic stool x1 tonight concerning for UGIB presumably 2/2 PBC. FOBT+. No prior EGD. Not known coagulopathy. Not on AC.  - Hb 12.3 from baseline 14-15. BUN 24.  - HD stable  - s/p 2L IVF and protonix 80 IV  - has two 20gauge PIV  - c/w protonix gtt  - NPO  - CBC q6 for now, maintain active T&S, transfuse for Hb <7  - GI c/s placed

## 2019-09-30 NOTE — DIETITIAN INITIAL EVALUATION ADULT. - NS FNS WEIGHT CHANGE REASON
Pt reports 4 pounds weight loss x 4 months PTA due to appetite "on and off", from 112 to 108 pounds. Weight as per flow sheets (09/26) 108 pounds -will continue to monitor.

## 2019-09-30 NOTE — PROGRESS NOTE ADULT - ATTENDING COMMENTS
agree with above, please also get AFP level agree with above, please also get AFP level.   Can take tylenol up to 2 g/day for pain.

## 2019-09-30 NOTE — PROGRESS NOTE ADULT - ASSESSMENT
75 y/o F w/ hx of primary biliary cholangitis presenting with hematemesis and melena.    Impression:  # Acute blood loss anemia with hematemesis and melena: EGD with esophageal varices w/ recent stigmata of bleeding s/p banding on 9/27 with complete eradication. Currently with no overt bleeding, HD stable, and with Hgb ~ 10.0.  # Primary biliary cholangitis: On ursodiol.   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  Varices: EGD on 9/27/19 with esophageal varices with recent stigmata of bleeding s/p banding   Ascites: No ascites on CT A/P on 9/27/19  HE: AAO x 3  HCC: CT A/P w/ IV contrast on 9/27/19 with indeterminate right lobe liver lesion  MELD-Na: 8 on 9/27/19  # DM    Recommendations:  - No contraindication to discharge from Hepatology perspective once patient has completed 5 day course of octreotide and ceftriaxone and if there are no signs of overt bleeding  - Octreotide 50 mcg/hr for 5 days (end date 10/1/19)  - Start nadolol 20 mg PO daily tomorrow (10/1/19)  - Ceftriaxone 1g q24h for 5 days (end date 10/1/19)  - Repeat upper endoscopy in 4 weeks for repeat banding if needed  - Monitor CBCs daily  - Monitor daily CMP and INR  - Monitor bowel movements  - Transfuse for goal Hgb = 7.0 to 8.0  - Two large bore IVs  - Maintain active type and screen  - Rest of care per primary team  - Outpatient follow-up with Dr. Anaid Morelos ([phone])    Juan Antonio Carpenter MD  Gastroenterology Fellow  Pager number: [phone] / [phone]    Please call Hepatology ([phone]) if there are any additional questions or concerns. Please call on-call GI fellow after 5pm and before 8am, and on weekends. 73 y/o F w/ hx of primary biliary cholangitis presenting with hematemesis and melena.    Impression:  # Acute blood loss anemia with hematemesis and melena: EGD with esophageal varices w/ recent stigmata of bleeding s/p banding on 9/27 with complete eradication. Currently with no overt bleeding, HD stable, and with Hgb ~ 10.0.  # Primary biliary cholangitis: On ursodiol 250 mg PO TID  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  Varices: EGD on 9/27/19 with esophageal varices with recent stigmata of bleeding s/p banding   Ascites: No ascites on CT A/P on 9/27/19  HE: AAO x 3  HCC: CT A/P w/ IV contrast on 9/27/19 with indeterminate right lobe liver lesion  MELD-Na: 8 on 9/27/19  # DM    Recommendations:  - No contraindication to discharge from Hepatology perspective once patient has completed 5 day course of octreotide and ceftriaxone and if there are no signs of overt bleeding  - MRI abdomen w/ IV contrast to further evaluate indeterminate right lobe liver lesion - can obtain as outpatient  - Would stop NSAIDs and stop PPI   - Octreotide 50 mcg/hr for 5 days (end date 10/1/19)  - Start nadolol 20 mg PO daily tomorrow (10/1/19)  - Ceftriaxone 1g q24h for 5 days (end date 10/1/19)  - Repeat upper endoscopy in 4 weeks for repeat banding if needed  - Monitor CBCs daily  - Monitor daily CMP and INR  - Monitor bowel movements  - Transfuse for goal Hgb = 7.0 to 8.0  - Two large bore IVs  - Maintain active type and screen  - Rest of care per primary team  - Outpatient follow-up with Dr. Anaid Morelos ([phone])    Juan Antonio Carpenter MD  Gastroenterology Fellow  Pager number: [phone] / [phone]    Please call Hepatology ([phone]) if there are any additional questions or concerns. Please call on-call GI fellow after 5pm and before 8am, and on weekends.

## 2019-09-30 NOTE — DIETITIAN INITIAL EVALUATION ADULT. - OTHER INFO
Pt reports fair appetite and PO intake at home, states appetite has been "on and off" in the last 4 months due to "not feeling hungry and not sleeping well". Reports allergies to shellfish; states causes abdominal pain; denies having other food allergies or intolerances. Reports taking Vitamin D, Vitamin B12, Vitamin B6, Caltrate, and Glucerna 2-3xday PTA. Pt reports not following any type of diet or restriction at home. Reports monitoring BG 3xday with ranges between 60 - 200 mg/dl and states taking Lantus at home; HbA1c as per  chart (09/30) 6.8% - indicates good BG control but ?accuracy as pt reports episodes of hypoglycemia PTA.    Pt reports continues with fair appetite and PO intake. Noted 40% PO intake as per flow sheets. Offered nutritional supplementation - pt agreed to Glucerna, spoke to NP. Reports tolerating mechanical soft diet; denies difficulty chewing/swallowing. Pt denies nausea, vomiting, diarrhea, or constipation, reports last BM today (09/360).     Provided recommendations to optimize PO and protein intake, recommended small frequent meals by ordering nutrient-dense snacks and leaving food away from tray for later consumption during the day or between meals, to start with protein, and sips of supplement throughout the day; reviewed foods with protein and menu order procedures in hospital.     Provided education on T2DM and heart healthy nutrition therapy. Provided education on foods containing carbohydrates, foods containing proteins, and portion sizes. Stressed the importance of a balanced meal to maintain blood glucose. Encouraged vegetables consumption. Encouraged Pt to continue monitoring blood glucose at home. Discussed how to manage hypoglycemia. Discussed recommended snacks and the importance of not skipping meals. Recommended water consumption with avoidance of soda and juice. Recommended limited salt intake. Reviewed foods high in salt and amount of salt recommended per day. Discussed nutrition label reading. Stressed the importance of limited fried foods and saturated fat consumption. Pt amenable for recommendations/education - made aware RD remains available.

## 2019-09-30 NOTE — DIETITIAN INITIAL EVALUATION ADULT. - ADD RECOMMEND
1. Will continue to monitor PO intake, weight, labs, skin, GI status, diet. 2. Encourage PO intake and obtain food preferences (obtained at this time). 3. Provided recommendations to optimize PO and protein intake and education on DM and heart healthy nutrition therapy - pt made aware RD remains available. 4. Malnutrition notification placed in chart - spoke to NP.

## 2019-10-01 VITALS
TEMPERATURE: 99 F | OXYGEN SATURATION: 97 % | HEART RATE: 73 BPM | DIASTOLIC BLOOD PRESSURE: 73 MMHG | SYSTOLIC BLOOD PRESSURE: 154 MMHG | RESPIRATION RATE: 17 BRPM

## 2019-10-01 LAB
AFP-TM SERPL-MCNC: <1.8 NG/ML — SIGNIFICANT CHANGE UP
ALBUMIN SERPL ELPH-MCNC: 3.2 G/DL — LOW (ref 3.3–5)
ALP SERPL-CCNC: 80 U/L — SIGNIFICANT CHANGE UP (ref 40–120)
ALT FLD-CCNC: 24 U/L — SIGNIFICANT CHANGE UP (ref 10–45)
ANION GAP SERPL CALC-SCNC: 9 MMOL/L — SIGNIFICANT CHANGE UP (ref 5–17)
APTT BLD: 30.4 SEC — SIGNIFICANT CHANGE UP (ref 27.5–36.3)
AST SERPL-CCNC: 36 U/L — SIGNIFICANT CHANGE UP (ref 10–40)
BILIRUB SERPL-MCNC: 0.5 MG/DL — SIGNIFICANT CHANGE UP (ref 0.2–1.2)
BUN SERPL-MCNC: 7 MG/DL — SIGNIFICANT CHANGE UP (ref 7–23)
CALCIUM SERPL-MCNC: 8.2 MG/DL — LOW (ref 8.4–10.5)
CHLORIDE SERPL-SCNC: 106 MMOL/L — SIGNIFICANT CHANGE UP (ref 96–108)
CO2 SERPL-SCNC: 27 MMOL/L — SIGNIFICANT CHANGE UP (ref 22–31)
CREAT SERPL-MCNC: 0.59 MG/DL — SIGNIFICANT CHANGE UP (ref 0.5–1.3)
GLUCOSE SERPL-MCNC: 150 MG/DL — HIGH (ref 70–99)
HCT VFR BLD CALC: 29.3 % — LOW (ref 34.5–45)
HGB BLD-MCNC: 9.6 G/DL — LOW (ref 11.5–15.5)
INR BLD: 1.15 RATIO — SIGNIFICANT CHANGE UP (ref 0.88–1.16)
MCHC RBC-ENTMCNC: 32.8 GM/DL — SIGNIFICANT CHANGE UP (ref 32–36)
MCHC RBC-ENTMCNC: 33.9 PG — SIGNIFICANT CHANGE UP (ref 27–34)
MCV RBC AUTO: 103.5 FL — HIGH (ref 80–100)
PLATELET # BLD AUTO: 95 K/UL — LOW (ref 150–400)
POTASSIUM SERPL-MCNC: 4.1 MMOL/L — SIGNIFICANT CHANGE UP (ref 3.5–5.3)
POTASSIUM SERPL-SCNC: 4.1 MMOL/L — SIGNIFICANT CHANGE UP (ref 3.5–5.3)
PROT SERPL-MCNC: 6.1 G/DL — SIGNIFICANT CHANGE UP (ref 6–8.3)
PROTHROM AB SERPL-ACNC: 13 SEC — SIGNIFICANT CHANGE UP (ref 10–13.1)
RBC # BLD: 2.83 M/UL — LOW (ref 3.8–5.2)
RBC # FLD: 13.2 % — SIGNIFICANT CHANGE UP (ref 10.3–14.5)
SODIUM SERPL-SCNC: 142 MMOL/L — SIGNIFICANT CHANGE UP (ref 135–145)
WBC # BLD: 3.94 K/UL — SIGNIFICANT CHANGE UP (ref 3.8–10.5)
WBC # FLD AUTO: 3.94 K/UL — SIGNIFICANT CHANGE UP (ref 3.8–10.5)

## 2019-10-01 PROCEDURE — 83690 ASSAY OF LIPASE: CPT

## 2019-10-01 PROCEDURE — 84132 ASSAY OF SERUM POTASSIUM: CPT

## 2019-10-01 PROCEDURE — 85027 COMPLETE CBC AUTOMATED: CPT

## 2019-10-01 PROCEDURE — 85014 HEMATOCRIT: CPT

## 2019-10-01 PROCEDURE — 82330 ASSAY OF CALCIUM: CPT

## 2019-10-01 PROCEDURE — 83605 ASSAY OF LACTIC ACID: CPT

## 2019-10-01 PROCEDURE — 96374 THER/PROPH/DIAG INJ IV PUSH: CPT | Mod: XU

## 2019-10-01 PROCEDURE — 86901 BLOOD TYPING SEROLOGIC RH(D): CPT

## 2019-10-01 PROCEDURE — 82272 OCCULT BLD FECES 1-3 TESTS: CPT

## 2019-10-01 PROCEDURE — 82105 ALPHA-FETOPROTEIN SERUM: CPT

## 2019-10-01 PROCEDURE — 99285 EMERGENCY DEPT VISIT HI MDM: CPT | Mod: 25

## 2019-10-01 PROCEDURE — 82435 ASSAY OF BLOOD CHLORIDE: CPT

## 2019-10-01 PROCEDURE — 83735 ASSAY OF MAGNESIUM: CPT

## 2019-10-01 PROCEDURE — 82962 GLUCOSE BLOOD TEST: CPT

## 2019-10-01 PROCEDURE — 82803 BLOOD GASES ANY COMBINATION: CPT

## 2019-10-01 PROCEDURE — 93975 VASCULAR STUDY: CPT

## 2019-10-01 PROCEDURE — 99239 HOSP IP/OBS DSCHRG MGMT >30: CPT

## 2019-10-01 PROCEDURE — 90686 IIV4 VACC NO PRSV 0.5 ML IM: CPT

## 2019-10-01 PROCEDURE — 85610 PROTHROMBIN TIME: CPT

## 2019-10-01 PROCEDURE — 86850 RBC ANTIBODY SCREEN: CPT

## 2019-10-01 PROCEDURE — 71045 X-RAY EXAM CHEST 1 VIEW: CPT

## 2019-10-01 PROCEDURE — 80048 BASIC METABOLIC PNL TOTAL CA: CPT

## 2019-10-01 PROCEDURE — 74177 CT ABD & PELVIS W/CONTRAST: CPT

## 2019-10-01 PROCEDURE — 84295 ASSAY OF SERUM SODIUM: CPT

## 2019-10-01 PROCEDURE — 80053 COMPREHEN METABOLIC PANEL: CPT

## 2019-10-01 PROCEDURE — C1889: CPT

## 2019-10-01 PROCEDURE — 86803 HEPATITIS C AB TEST: CPT

## 2019-10-01 PROCEDURE — 86900 BLOOD TYPING SEROLOGIC ABO: CPT

## 2019-10-01 PROCEDURE — 83036 HEMOGLOBIN GLYCOSYLATED A1C: CPT

## 2019-10-01 PROCEDURE — 85730 THROMBOPLASTIN TIME PARTIAL: CPT

## 2019-10-01 PROCEDURE — 82947 ASSAY GLUCOSE BLOOD QUANT: CPT

## 2019-10-01 RX ORDER — LOSARTAN POTASSIUM 100 MG/1
1 TABLET, FILM COATED ORAL
Qty: 0 | Refills: 0 | DISCHARGE

## 2019-10-01 RX ORDER — NADOLOL 80 MG/1
20 TABLET ORAL DAILY
Refills: 0 | Status: DISCONTINUED | OUTPATIENT
Start: 2019-10-01 | End: 2019-10-01

## 2019-10-01 RX ORDER — NADOLOL 80 MG/1
1 TABLET ORAL
Qty: 30 | Refills: 0
Start: 2019-10-01 | End: 2019-10-30

## 2019-10-01 RX ORDER — SUCRALFATE 1 G
1 TABLET ORAL
Qty: 120 | Refills: 0
Start: 2019-10-01 | End: 2019-10-30

## 2019-10-01 RX ADMIN — URSODIOL 250 MILLIGRAM(S): 250 TABLET, FILM COATED ORAL at 05:54

## 2019-10-01 RX ADMIN — Medication 1 GRAM(S): at 05:54

## 2019-10-01 RX ADMIN — INFLUENZA VIRUS VACCINE 0.5 MILLILITER(S): 15; 15; 15; 15 SUSPENSION INTRAMUSCULAR at 16:04

## 2019-10-01 RX ADMIN — NADOLOL 20 MILLIGRAM(S): 80 TABLET ORAL at 11:19

## 2019-10-01 RX ADMIN — CEFTRIAXONE 100 MILLIGRAM(S): 500 INJECTION, POWDER, FOR SOLUTION INTRAMUSCULAR; INTRAVENOUS at 14:51

## 2019-10-01 RX ADMIN — SODIUM CHLORIDE 50 MILLILITER(S): 9 INJECTION INTRAMUSCULAR; INTRAVENOUS; SUBCUTANEOUS at 05:53

## 2019-10-01 RX ADMIN — Medication 1 GRAM(S): at 11:19

## 2019-10-01 RX ADMIN — URSODIOL 250 MILLIGRAM(S): 250 TABLET, FILM COATED ORAL at 14:51

## 2019-10-01 RX ADMIN — OCTREOTIDE ACETATE 10 MICROGRAM(S)/HR: 200 INJECTION, SOLUTION INTRAVENOUS; SUBCUTANEOUS at 05:53

## 2019-10-01 RX ADMIN — Medication 1: at 12:51

## 2019-10-01 RX ADMIN — SERTRALINE 25 MILLIGRAM(S): 25 TABLET, FILM COATED ORAL at 11:19

## 2019-10-01 NOTE — PROGRESS NOTE ADULT - PROBLEM SELECTOR PLAN 2
- PBC with cirrhosis    - c/w home ursodiol
2/2 UGIB- plan as above
- PBC with cirrhosis    - c/w home ursodiol
2/2 UGIB- plan as above

## 2019-10-01 NOTE — PROGRESS NOTE ADULT - SUBJECTIVE AND OBJECTIVE BOX
Chief Complaint: Bloody emesis    Interval Events:   - The patient tolerated a regular diet yesterday  - The patient denies bloody emesis, coffee ground emesis, bloody stools, and black tarry stools.  - She has not had a bowel movement since this past Thursday    Allergies:  epinephrine (Rash)  pcn seafood (Unknown)  penicillin (Rash)    Hospital Medications:  cefTRIAXone   IVPB 1000 milliGRAM(s) IV Intermittent every 24 hours  dextrose 40% Gel 15 Gram(s) Oral once PRN  dextrose 5%. 1000 milliLiter(s) IV Continuous <Continuous>  dextrose 50% Injectable 12.5 Gram(s) IV Push once  dextrose 50% Injectable 25 Gram(s) IV Push once  dextrose 50% Injectable 25 Gram(s) IV Push once  glucagon  Injectable 1 milliGRAM(s) IntraMuscular once PRN  ibuprofen  Tablet. 600 milliGRAM(s) Oral once  influenza   Vaccine 0.5 milliLiter(s) IntraMuscular once  insulin glargine Injectable (LANTUS) 12 Unit(s) SubCutaneous at bedtime  insulin lispro (HumaLOG) corrective regimen sliding scale   SubCutaneous every 6 hours  octreotide  Infusion 50 MICROgram(s)/Hr IV Continuous <Continuous>  ondansetron Injectable 4 milliGRAM(s) IV Push every 6 hours PRN  pantoprazole    Tablet 40 milliGRAM(s) Oral before breakfast  sertraline 25 milliGRAM(s) Oral daily  sodium chloride 0.9%. 1000 milliLiter(s) IV Continuous <Continuous>  sucralfate 1 Gram(s) Oral every 6 hours  ursodiol Tablet 250 milliGRAM(s) Oral three times a day    PMHX/PSHX:  Breast cancer  Panic disorder  Hypertension  Cirrhosis  Diabetes  H/O bilateral mastectomy    ROS:     General:  No wt loss, fevers, chills, night sweats, fatigue  Eyes:  Good vision, no reported pain  ENT:  No sore throat, pain, runny nose, dysphagia  CV:  No pain, palpitations, hypo/hypertension  Pulm:  No dyspnea, cough, tachypnea, wheezing  GI:  No pain, No nausea, No bloody vomiting, No diarrhea, No constipation, No weight loss, No fever, No pruritis, No rectal bleeding, No tarry stools, No dysphagia,  :  No pain, bleeding, incontinence, nocturia  Muscle:  No pain, weakness  Neuro:  No weakness, tingling, memory problems  Psych:  No fatigue, insomnia, mood problems, depression  Endocrine:  No polyuria, polydipsia, cold/heat intolerance  Heme:  No petechiae, ecchymosis, easy bruisability  Skin:  No rash, tattoos, scars, edema      PHYSICAL EXAM:   Vital Signs:  Vital Signs Last 24 Hrs  T(C): 37.2 (30 Sep 2019 04:23), Max: 37.2 (30 Sep 2019 04:23)  T(F): 99 (30 Sep 2019 04:23), Max: 99 (30 Sep 2019 04:23)  HR: 82 (30 Sep 2019 04:23) (75 - 83)  BP: 153/72 (30 Sep 2019 04:23) (132/66 - 162/79)  BP(mean): --  RR: 18 (30 Sep 2019 04:23) (17 - 18)  SpO2: 97% (30 Sep 2019 04:23) (96% - 99%)    GENERAL:  No acute distress  HEENT:  Normocephalic/atraumtic,  no scleral icterus  CHEST:  Clear to auscultation bilaterally, no wheezes/rales/ronchi, no accessory muscle use  HEART:  Regular rate and rhythm, no murmurs/rubs/gallops  ABDOMEN:  Soft, non-tender, non-distended, normoactive bowel sounds, no masses, no hepato-splenomegaly, no signs of chronic liver disease  EXTREMITIES:  No cyanosis, clubbing, or edema  SKIN:  No rash/erythema/ecchymoses/petechiae/wounds/abscess/warm/dry  NEURO:  Alert and oriented x 3, no asterixis, no tremor    LABS:                        10.2   3.9   )-----------( 90       ( 30 Sep 2019 06:48 )             30.5     Mean Cell Volume: 104.0 fl (09-30-19 @ 06:48)    09-29    141  |  106  |  8   ----------------------------<  136<H>  4.0   |  27  |  0.54    Ca    8.6      29 Sep 2019 06:59               10.2   3.9   )-----------( 90       ( 30 Sep 2019 06:48 )             30.5                         10.0   3.9   )-----------( 79       ( 29 Sep 2019 23:47 )             30.4                         10.0   4.7   )-----------( 90       ( 29 Sep 2019 14:33 )             30.7                         10.5   5.1   )-----------( 82       ( 29 Sep 2019 06:59 )             30.4                         10.0   4.7   )-----------( 78       ( 29 Sep 2019 00:01 )             30.1     Imaging:    No new imaging
Patient is a 74y old  Female who presents with a chief complaint of Hematemesis (30 Sep 2019 08:10)    INTERVAL EVENTS: nayana      Hepatic cirrhosis  FH: breast cancer  Handoff  MEWS Score  Breast cancer  Panic disorder  Hypertension  Cirrhosis  Diabetes  Cirrhosis  Prophylactic measure  Breast cancer  Type 2 diabetes mellitus  Primary biliary cholangitis  UGIB (upper gastrointestinal bleed)  H/O bilateral mastectomy  No significant past surgical history  VOMITING BLOOD 3X  RAD CDS  90+      Review of Systems: 12 point review of systems otherwise negative  ( - )fevers/chills  ( - ) dyspnea  ( - ) cough  ( - ) chest pain  ( - ) palpatations  ( - ) dizziness/lightheadedness  ( - ) nausea/vomiting  ( - ) abd pain  ( - ) diarrhea  ( - ) melena  ( - ) hematochezia  ( - ) dysuria  ( - ) hematuria  ( - ) leg swelling  ( -) calf tenderness  ( - ) motor weakness  ( - ) extremity numbness  ( - ) back pain  ( + ) tolerating POs  ( + ) BM    MEDICATIONS  (STANDING):  cefTRIAXone   IVPB 1000 milliGRAM(s) IV Intermittent every 24 hours  dextrose 5%. 1000 milliLiter(s) (50 mL/Hr) IV Continuous <Continuous>  dextrose 50% Injectable 12.5 Gram(s) IV Push once  dextrose 50% Injectable 25 Gram(s) IV Push once  dextrose 50% Injectable 25 Gram(s) IV Push once  influenza   Vaccine 0.5 milliLiter(s) IntraMuscular once  insulin glargine Injectable (LANTUS) 12 Unit(s) SubCutaneous at bedtime  insulin lispro (HumaLOG) corrective regimen sliding scale   SubCutaneous every 6 hours  octreotide  Infusion 50 MICROgram(s)/Hr (10 mL/Hr) IV Continuous <Continuous>  sertraline 25 milliGRAM(s) Oral daily  sodium chloride 0.9%. 1000 milliLiter(s) (50 mL/Hr) IV Continuous <Continuous>  sucralfate 1 Gram(s) Oral every 6 hours  ursodiol Tablet 250 milliGRAM(s) Oral three times a day    MEDICATIONS  (PRN):  dextrose 40% Gel 15 Gram(s) Oral once PRN Blood Glucose LESS THAN 70 milliGRAM(s)/deciliter  glucagon  Injectable 1 milliGRAM(s) IntraMuscular once PRN Glucose LESS THAN 70 milligrams/deciliter  ondansetron Injectable 4 milliGRAM(s) IV Push every 6 hours PRN Nausea      Allergies    epinephrine (Rash)  pcn seafood (Unknown)  penicillin (Rash)    Intolerances          Vital Signs Last 24 Hrs  T(C): 37.2 (30 Sep 2019 04:23), Max: 37.2 (30 Sep 2019 04:23)  T(F): 99 (30 Sep 2019 04:23), Max: 99 (30 Sep 2019 04:23)  HR: 82 (30 Sep 2019 04:23) (75 - 83)  BP: 153/72 (30 Sep 2019 04:23) (132/66 - 162/79)  BP(mean): --  RR: 18 (30 Sep 2019 04:23) (17 - 18)  SpO2: 97% (30 Sep 2019 04:23) (96% - 99%)  CAPILLARY BLOOD GLUCOSE      POCT Blood Glucose.: 199 mg/dL (30 Sep 2019 12:57)  POCT Blood Glucose.: 118 mg/dL (30 Sep 2019 05:12)  POCT Blood Glucose.: 169 mg/dL (29 Sep 2019 23:41)  POCT Blood Glucose.: 182 mg/dL (29 Sep 2019 22:07)  POCT Blood Glucose.: 88 mg/dL (29 Sep 2019 18:03)      09-29 @ 07:01  -  09-30 @ 07:00  --------------------------------------------------------  IN: 1841 mL / OUT: 0 mL / NET: 1841 mL        Physical Exam:    Daily     Daily   General:  NAD  HEENT:  + scleral icterus, PERRLA  CV:  RRR, no murmur  Lungs:  CTA B/L, no wheezes, rales, rhonchi  Abdomen:  Soft, non-tender, no distended, positive BS  Extremities:  no edema  Skin:  Warm and dry, no rashes  :  No flannery  Neuro:  AAOx3, non-focal  No Restraints    LABS:                        10.2   3.9   )-----------( 90       ( 30 Sep 2019 06:48 )             30.5     09-29    141  |  106  |  8   ----------------------------<  136<H>  4.0   |  27  |  0.54    Ca    8.6      29 Sep 2019 06:59              RADIOLOGY & ADDITIONAL TESTS:    ---------------------------------------------------------------------------  I personally reviewed: [  ]EKG   [  ]CXR    [  ] CT    [  ]Other  ---------------------------------------------------------------------------  PLEASE CHECK WHEN PRESENT:     [  ]Heart Failure     [  ] Acute     [  ] Acute on Chronic     [  ] Chronic  -------------------------------------------------------------------     [  ]Diastolic [HFpEF]     [  ]Systolic [HFrEF]     [  ]Combined [HFpEF & HFrEF]     [  ]Other:  -------------------------------------------------------------------  [  ]GENNA     [  ]ATN     [  ]Reneal Medullary Necrosis     [  ]Renal Cortical Necrosis     [  ]Other Pathological Lesions:    [  ]CKD 1  [  ]CKD 2  [  ]CKD 3  [  ]CKD 4  [  ]CKD 5  [  ]Other  -------------------------------------------------------------------  [  ]Other/Unspecified:    --------------------------------------------------------------------    Abdominal Nutritional Status  [  ]Malnutrition: See Nutrition Note  [  ]Cachexia  [  ]Other:   [  ]Supplement Ordered:  [  ]Morbid Obesity (BMI >=40]
patient denies bloody or black stools, no nausea or vomiting. tolerating full liquid diet. overall, no complaints.     GENERAL: No fevers, no chills.  EYES: No blurry vision,  No photophobia  ENT: No sore throat.  No dysphagia  Cardiovascular: No chest pain, palpitations, orthopnea  Pulmonary: No cough, no wheezing. No shortness of breath  Gastrointestinal: No abdominal pain, no diarrhea, no constipation.  No melena.  No hematochezia  Musculoskeletal: No weakness.  No myalgias.  Dermatology:  No rashes.  Neuro: No Headache.  No vertigo.  No dizziness.  Psych: No anxiety, no depression.  Denies suicidal thoughts.    MEDICATIONS  (STANDING):  cefTRIAXone   IVPB 1000 milliGRAM(s) IV Intermittent every 24 hours  dextrose 5%. 1000 milliLiter(s) (50 mL/Hr) IV Continuous <Continuous>  dextrose 50% Injectable 12.5 Gram(s) IV Push once  dextrose 50% Injectable 25 Gram(s) IV Push once  dextrose 50% Injectable 25 Gram(s) IV Push once  ibuprofen  Tablet. 600 milliGRAM(s) Oral once  influenza   Vaccine 0.5 milliLiter(s) IntraMuscular once  insulin glargine Injectable (LANTUS) 12 Unit(s) SubCutaneous at bedtime  insulin lispro (HumaLOG) corrective regimen sliding scale   SubCutaneous every 6 hours  octreotide  Infusion 50 MICROgram(s)/Hr (10 mL/Hr) IV Continuous <Continuous>  pantoprazole    Tablet 40 milliGRAM(s) Oral before breakfast  sertraline 25 milliGRAM(s) Oral daily  sodium chloride 0.9%. 1000 milliLiter(s) (50 mL/Hr) IV Continuous <Continuous>  sucralfate 1 Gram(s) Oral every 6 hours  ursodiol Tablet 250 milliGRAM(s) Oral three times a day    MEDICATIONS  (PRN):  dextrose 40% Gel 15 Gram(s) Oral once PRN Blood Glucose LESS THAN 70 milliGRAM(s)/deciliter  glucagon  Injectable 1 milliGRAM(s) IntraMuscular once PRN Glucose LESS THAN 70 milligrams/deciliter  ondansetron Injectable 4 milliGRAM(s) IV Push every 6 hours PRN Nausea    Vital Signs Last 24 Hrs  T(C): 36.3 (29 Sep 2019 13:55), Max: 36.9 (28 Sep 2019 20:03)  T(F): 97.4 (29 Sep 2019 13:55), Max: 98.4 (28 Sep 2019 20:03)  HR: 75 (29 Sep 2019 13:55) (75 - 100)  BP: 132/66 (29 Sep 2019 13:55) (130/70 - 151/66)  BP(mean): --  RR: 18 (29 Sep 2019 13:55) (18 - 18)  SpO2: 96% (29 Sep 2019 13:55) (92% - 99%)    GENERAL: NAD, well-developed  HEAD:  Atraumatic, Normocephalic  EYES: EOMI, PERRLA, conjunctiva and sclera clear  ENT: Pharynx not erythematous  PULMONARY: Clear to auscultation bilaterally; No wheeze  CARDIOVASCULAR: Regular rate and rhythm; No murmurs, rubs, or gallops  ABDOMEN: Soft, Nontender, Nondistended; Bowel sounds present  EXTREMITIES:  2+ Peripheral Pulses, No clubbing, cyanosis, or edema  MUSCULOSKELETAL: No calf tenderness  PSYCH: AAOx3, normal affect  SKIN: warm and dry, No rashes or lesions    .  LABS:                         10.5   5.1   )-----------( 82       ( 29 Sep 2019 06:59 )             30.4     09-29    141  |  106  |  8   ----------------------------<  136<H>  4.0   |  27  |  0.54    Ca    8.6      29 Sep 2019 06:59                RADIOLOGY, EKG & ADDITIONAL TESTS: Reviewed.
Chief Complaint:  Patient is a 74y old  Female who presents with a chief complaint of Hematemesis (28 Sep 2019 09:42)      Interval Events: Pt not having BM since procedure. Denies n/v/d. States she feels well. Tolerating po    Allergies:  epinephrine (Rash)  pcn seafood (Unknown)  penicillin (Rash)      Hospital Medications:  cefTRIAXone   IVPB 1000 milliGRAM(s) IV Intermittent every 24 hours  dextrose 40% Gel 15 Gram(s) Oral once PRN  dextrose 5%. 1000 milliLiter(s) IV Continuous <Continuous>  dextrose 50% Injectable 12.5 Gram(s) IV Push once  dextrose 50% Injectable 25 Gram(s) IV Push once  dextrose 50% Injectable 25 Gram(s) IV Push once  glucagon  Injectable 1 milliGRAM(s) IntraMuscular once PRN  influenza   Vaccine 0.5 milliLiter(s) IntraMuscular once  insulin glargine Injectable (LANTUS) 6 Unit(s) SubCutaneous at bedtime  insulin lispro (HumaLOG) corrective regimen sliding scale   SubCutaneous every 6 hours  octreotide  Infusion 50 MICROgram(s)/Hr IV Continuous <Continuous>  ondansetron Injectable 4 milliGRAM(s) IV Push every 6 hours PRN  sertraline 25 milliGRAM(s) Oral daily  sodium chloride 0.9%. 1000 milliLiter(s) IV Continuous <Continuous>  ursodiol Tablet 250 milliGRAM(s) Oral three times a day      PMHX/PSHX:  Breast cancer  Panic disorder  Hypertension  Cirrhosis  Diabetes  H/O bilateral mastectomy  No significant past surgical history      Family history:  FH: breast cancer      ROS:     General:  No wt loss, fevers, chills, night sweats, fatigue,   Eyes:  Good vision, no reported pain  ENT:  No sore throat, pain, runny nose, dysphagia  CV:  No pain, palpitations, hypo/hypertension  Resp:  No dyspnea, cough, tachypnea, wheezing  GI:  See HPI  :  No pain, bleeding, incontinence, nocturia  Muscle:  No pain, weakness  Neuro:  No weakness, tingling, memory problems  Psych:  No fatigue, insomnia, mood problems, depression  Endocrine:  No polyuria, polydipsia, cold/heat intolerance  Heme:  No petechiae, ecchymosis, easy bruisability  Skin:  No rash, edema      PHYSICAL EXAM:     Vital Signs:  Vital Signs Last 24 Hrs  T(C): 36.7 (28 Sep 2019 10:43), Max: 37 (28 Sep 2019 04:59)  T(F): 98.1 (28 Sep 2019 10:43), Max: 98.6 (28 Sep 2019 04:59)  HR: 80 (28 Sep 2019 10:43) (80 - 89)  BP: 144/82 (28 Sep 2019 10:43) (118/77 - 144/82)  BP(mean): --  RR: 18 (28 Sep 2019 10:43) (16 - 18)  SpO2: 94% (28 Sep 2019 10:43) (94% - 97%)  Daily     Daily     GENERAL:  appears comfortable, no acute distress  HEENT:  NC/AT,  conjunctivae clear, sclera -anicteric  CHEST:  CTABL, no increased effort  HEART:  Regular rhythm, S1, S2, no murmur/rub/S3/S4  ABDOMEN:  Soft, non-tender, non-distended, normoactive bowel sounds,  no masses ,no hepato-splenomegaly,   EXTREMITIES:  no cyanosis, clubbing or edema  SKIN:  No rash/erythema/ecchymoses/petechiae/wounds  NEURO:  Alert, oriented    LABS:                        10.4   3.9   )-----------( 77       ( 28 Sep 2019 10:39 )             31.4     09-27    141  |  109<H>  |  16  ----------------------------<  145<H>  3.9   |  25  |  0.53    Ca    8.7      27 Sep 2019 06:17  Mg     1.8     09-27    TPro  7.2  /  Alb  3.7  /  TBili  0.7  /  DBili  x   /  AST  37  /  ALT  32  /  AlkPhos  130<H>  09-26    LIVER FUNCTIONS - ( 26 Sep 2019 19:36 )  Alb: 3.7 g/dL / Pro: 7.2 g/dL / ALK PHOS: 130 U/L / ALT: 32 U/L / AST: 37 U/L / GGT: x           PT/INR - ( 26 Sep 2019 19:36 )   PT: 13.0 sec;   INR: 1.13 ratio         PTT - ( 26 Sep 2019 19:36 )  PTT:29.2 sec        Imaging:    < from: Upper Endoscopy (09.27.19 @ 13:30) >  Findings:       Two columns of non-bleeding large (> 5 mm) varices were found in the middle third of the        esophagus and in the lower third of the esophagus, 37 cm from the incisors. Stigmata of        recent bleeding were evident and red torsten signs were present. Four bands were successfully        placed with complete eradication of varices. There was no bleeding at the end of the        procedure.       The exam of the esophagus was otherwise normal.       Portal hypertensive gastropathy was found in the gastric body and in the gastric antrum.       The exam of the stomach was otherwise normal. No gastric varices were visualized.       The duodenal bulb and 2nd part of the duodenum were normal.                                                                                                        Impression:          - Recently bleeding large (> 5 mm) esophageal varices. Completely eradicated                        via band ligation.                       - Portal hypertensive gastropathy.                       - Normal duodenal bulb and 2nd part of the duodenum.                       - No specimens collected.  Recommendation:      - Return patient to [facility] for ongoing care.                       - Clear liquid diet.                       - Discontinue pantoprazole infusion.                 - Continue octreotide x 5 days                       - Continue ceftriaxone x 5 to 7 days                       - Repeat upper endoscopy within 4 weeks for repeat banding.                       - Hepatology will continue to follow    < end of copied text >      < from: CT Abdomen and Pelvis w/ IV Cont (09.27.19 @ 00:17) >  FINDINGS:    LOWER CHEST: Subsegmental atelectasis in the right upper lobe.    LIVER: Cirrhosis with scattered calcifications. 2.1 cm indeterminate   hypodense lesion in the right lobe of the liver (2:19). Indeterminate   lesion in the right lobe the liver measuring 2.1 x 1.7 cm (3:29).  BILE DUCTS: Normal caliber.  GALLBLADDER: Gallbladder wall edema measuring up to 0.9 cm. Punctate   gallstone at the fundus of the gallbladder.  PANCREAS: Within normal limits.  ADRENALS: Within normal limits.  KIDNEYS/URETERS: Within normal limits.    BLADDER: Within normal limits.  REPRODUCTIVE ORGANS: Uterus and adnexa within normal limits.    BOWEL: Mural thickening of the ascending, transverse, and descending   colon, possibly portal colopathy or colitis. No bowel obstruction.   Appendix is normal.  PERITONEUM: Small amount of free fluid posterior to the gallbladder and   anterior to the spleen and in the pelvis.  VESSELS: Gastroesophageal varices. No evidence for contrast extravasation   into the bowel.  RETROPERITONEUM/LYMPH NODES: No lymphadenopathy.    ABDOMINAL WALL: Within normal limits.  BONES: Degenerative changes of the spine.    IMPRESSION:     No gastrointestinal bleed.    Mural thickening of the ascending, transverse, and descending colon,   possibly portal colopathy or colitis.    Cirrhosis with gastroesophageal varices.    Indeterminate liver lesions. Furtiher evaluation with contrast-enhanced   MRI abdomen is recommended    < end of copied text >
patient denies chest pain, no bloody or black stools. tolerating diet well.    GENERAL: No fevers, no chills.  EYES: No blurry vision,  No photophobia  ENT: No sore throat.  No dysphagia  Cardiovascular: No chest pain, palpitations, orthopnea  Pulmonary: No cough, no wheezing. No shortness of breath  Gastrointestinal: No abdominal pain, no diarrhea, no constipation.    Musculoskeletal: No weakness.  No myalgias.  Dermatology:  No rashes.  Neuro: No Headache.  No vertigo.  No dizziness.  Psych: No anxiety, no depression.  Denies suicidal thoughts.    MEDICATIONS  (STANDING):  cefTRIAXone   IVPB 1000 milliGRAM(s) IV Intermittent every 24 hours  dextrose 5%. 1000 milliLiter(s) (50 mL/Hr) IV Continuous <Continuous>  dextrose 50% Injectable 12.5 Gram(s) IV Push once  dextrose 50% Injectable 25 Gram(s) IV Push once  dextrose 50% Injectable 25 Gram(s) IV Push once  influenza   Vaccine 0.5 milliLiter(s) IntraMuscular once  insulin glargine Injectable (LANTUS) 6 Unit(s) SubCutaneous at bedtime  insulin lispro (HumaLOG) corrective regimen sliding scale   SubCutaneous every 6 hours  octreotide  Infusion 50 MICROgram(s)/Hr (10 mL/Hr) IV Continuous <Continuous>  pantoprazole    Tablet 40 milliGRAM(s) Oral before breakfast  sertraline 25 milliGRAM(s) Oral daily  sodium chloride 0.9%. 1000 milliLiter(s) (50 mL/Hr) IV Continuous <Continuous>  sucralfate 1 Gram(s) Oral every 6 hours  ursodiol Tablet 250 milliGRAM(s) Oral three times a day    MEDICATIONS  (PRN):  dextrose 40% Gel 15 Gram(s) Oral once PRN Blood Glucose LESS THAN 70 milliGRAM(s)/deciliter  glucagon  Injectable 1 milliGRAM(s) IntraMuscular once PRN Glucose LESS THAN 70 milligrams/deciliter  ondansetron Injectable 4 milliGRAM(s) IV Push every 6 hours PRN Nausea    Vital Signs Last 24 Hrs  T(C): 37.3 (28 Sep 2019 14:19), Max: 37.3 (28 Sep 2019 14:19)  T(F): 99.2 (28 Sep 2019 14:19), Max: 99.2 (28 Sep 2019 14:19)  HR: 84 (28 Sep 2019 14:19) (80 - 88)  BP: 152/74 (28 Sep 2019 14:19) (124/71 - 152/74)  BP(mean): --  RR: 18 (28 Sep 2019 14:19) (18 - 18)  SpO2: 95% (28 Sep 2019 14:19) (94% - 96%)    GENERAL: NAD, well-developed  HEAD:  Atraumatic, Normocephalic  EYES: EOMI, PERRLA, conjunctiva and sclera clear  ENT: Pharynx not erythematous  PULMONARY: Clear to auscultation bilaterally; No wheeze  CARDIOVASCULAR: Regular rate and rhythm; No murmurs, rubs, or gallops  ABDOMEN: Soft, Nontender, Nondistended; Bowel sounds present  EXTREMITIES:  2+ Peripheral Pulses, No clubbing, cyanosis, or edema  MUSCULOSKELETAL: No calf tenderness  PSYCH: AAOx3, normal affect  SKIN: warm and dry, No rashes or lesions    .  LABS:                         10.4   3.9   )-----------( 77       ( 28 Sep 2019 10:39 )             31.4     09-27    141  |  109<H>  |  16  ----------------------------<  145<H>  3.9   |  25  |  0.53    Ca    8.7      27 Sep 2019 06:17  Mg     1.8     09-27    TPro  7.2  /  Alb  3.7  /  TBili  0.7  /  DBili  x   /  AST  37  /  ALT  32  /  AlkPhos  130<H>  09-26    PT/INR - ( 26 Sep 2019 19:36 )   PT: 13.0 sec;   INR: 1.13 ratio         PTT - ( 26 Sep 2019 19:36 )  PTT:29.2 sec          RADIOLOGY, EKG & ADDITIONAL TESTS: Reviewed.
Patient is a 74y old  Female who presents with a chief complaint of Hematemesis (30 Sep 2019 17:12)      INTERVAL HPI/OVERNIGHT EVENTS: PERLITA      Hepatic cirrhosis  FH: breast cancer  Handoff  MEWS Score  Breast cancer  Panic disorder  Hypertension  Cirrhosis  Diabetes  UGIB (upper gastrointestinal bleed)  Cirrhosis  Insulin dependent type 2 diabetes mellitus, controlled  Acute blood loss anemia  Prophylactic measure  Breast cancer  Type 2 diabetes mellitus  Primary biliary cholangitis  UGIB (upper gastrointestinal bleed)  H/O bilateral mastectomy  No significant past surgical history  VOMITING BLOOD 3X  RAD CDS  90+  Liver lesion  Esophageal varices in cirrhosis  Cirrhosis  UGIB (upper gastrointestinal bleed)      Review of Systems: 12 point review of systems otherwise negative  ( - )fevers/chills  ( - ) dyspnea  ( - ) cough  ( - ) chest pain  ( - ) palpatations  ( - ) dizziness/lightheadedness  ( - ) nausea/vomiting  ( - ) abd pain  ( - ) diarrhea  ( - ) melena  ( - ) hematochezia  ( - ) dysuria  ( - ) hematuria  ( - ) leg swelling  ( -) calf tenderness  ( - ) motor weakness  ( - ) extremity numbness  ( - ) back pain  ( + ) tolerating POs  ( + ) BM    MEDICATIONS  (STANDING):  cefTRIAXone   IVPB 1000 milliGRAM(s) IV Intermittent every 24 hours  dextrose 5%. 1000 milliLiter(s) (50 mL/Hr) IV Continuous <Continuous>  dextrose 50% Injectable 12.5 Gram(s) IV Push once  dextrose 50% Injectable 25 Gram(s) IV Push once  dextrose 50% Injectable 25 Gram(s) IV Push once  influenza   Vaccine 0.5 milliLiter(s) IntraMuscular once  insulin glargine Injectable (LANTUS) 12 Unit(s) SubCutaneous at bedtime  insulin lispro (HumaLOG) corrective regimen sliding scale   SubCutaneous three times a day before meals  insulin lispro (HumaLOG) corrective regimen sliding scale   SubCutaneous at bedtime  nadolol 20 milliGRAM(s) Oral daily  octreotide  Infusion 50 MICROgram(s)/Hr (10 mL/Hr) IV Continuous <Continuous>  sertraline 25 milliGRAM(s) Oral daily  sucralfate 1 Gram(s) Oral every 6 hours  ursodiol Tablet 250 milliGRAM(s) Oral three times a day    MEDICATIONS  (PRN):  dextrose 40% Gel 15 Gram(s) Oral once PRN Blood Glucose LESS THAN 70 milliGRAM(s)/deciliter  glucagon  Injectable 1 milliGRAM(s) IntraMuscular once PRN Glucose LESS THAN 70 milligrams/deciliter  ondansetron Injectable 4 milliGRAM(s) IV Push every 6 hours PRN Nausea      Allergies    epinephrine (Rash)  pcn seafood (Unknown)  penicillin (Rash)    Intolerances          Vital Signs Last 24 Hrs  T(C): 37.2 (01 Oct 2019 05:14), Max: 37.4 (30 Sep 2019 14:31)  T(F): 99 (01 Oct 2019 05:14), Max: 99.3 (30 Sep 2019 14:31)  HR: 73 (01 Oct 2019 05:14) (66 - 79)  BP: 154/73 (01 Oct 2019 05:14) (135/80 - 154/73)  BP(mean): --  RR: 17 (01 Oct 2019 05:14) (17 - 18)  SpO2: 97% (01 Oct 2019 05:14) (97% - 99%)  CAPILLARY BLOOD GLUCOSE      POCT Blood Glucose.: 151 mg/dL (01 Oct 2019 12:32)  POCT Blood Glucose.: 76 mg/dL (01 Oct 2019 08:33)  POCT Blood Glucose.: 172 mg/dL (30 Sep 2019 22:03)  POCT Blood Glucose.: 139 mg/dL (30 Sep 2019 17:49)       @ :01  -  10-01 @ 07:00  --------------------------------------------------------  IN: 1200 mL / OUT: 0 mL / NET: 1200 mL    10-01 @ 07:01  -  10-01 @ 14:26  --------------------------------------------------------  IN: 920 mL / OUT: 0 mL / NET: 920 mL        Physical Exam:    Daily     Daily Weight in k.9 (30 Sep 2019 18:06)  General:  NAD  HEENT:  +icteric sclera, PERRLA  CV:  RRR, no murmur  Lungs:  CTA B/L, no wheezes, rales, rhonchi  Abdomen:  Soft, non-tender, no distended, positive BS  Extremities:  no edema  Skin:  Warm and dry, no rashes  :  No flannery  Neuro:  AAOx3, non-focal  No Restraints    LABS:                        9.6    3.94  )-----------( 95       ( 01 Oct 2019 09:11 )             29.3     10    142  |  106  |  7   ----------------------------<  150<H>  4.1   |  27  |  0.59    Ca    8.2<L>      01 Oct 2019 07:06    TPro  6.1  /  Alb  3.2<L>  /  TBili  0.5  /  DBili  x   /  AST  36  /  ALT  24  /  AlkPhos  80  10    PT/INR - ( 01 Oct 2019 09:15 )   PT: 13.0 sec;   INR: 1.15 ratio         PTT - ( 01 Oct 2019 09:15 )  PTT:30.4 sec        RADIOLOGY & ADDITIONAL TESTS:    ---------------------------------------------------------------------------  I personally reviewed: [  ]EKG   [  ]CXR    [  ] CT    [  ]Other  ---------------------------------------------------------------------------  PLEASE CHECK WHEN PRESENT:     [  ]Heart Failure     [  ] Acute     [  ] Acute on Chronic     [  ] Chronic  -------------------------------------------------------------------     [  ]Diastolic [HFpEF]     [  ]Systolic [HFrEF]     [  ]Combined [HFpEF & HFrEF]     [  ]Other:  -------------------------------------------------------------------  [  ]GENNA     [  ]ATN     [  ]Reneal Medullary Necrosis     [  ]Renal Cortical Necrosis     [  ]Other Pathological Lesions:    [  ]CKD 1  [  ]CKD 2  [  ]CKD 3  [  ]CKD 4  [  ]CKD 5  [  ]Other  -------------------------------------------------------------------  [  ]Other/Unspecified:    --------------------------------------------------------------------    Abdominal Nutritional Status  [  ]Malnutrition: See Nutrition Note  [  ]Cachexia  [  ]Other:   [  ]Supplement Ordered:  [  ]Morbid Obesity (BMI >=40]

## 2019-10-01 NOTE — PROGRESS NOTE ADULT - PROBLEM SELECTOR PLAN 4
- In remission s/p b/l mastectomy off CRT
- PBC with cirrhosis    - c/w home ursodiol
- In remission s/p b/l mastectomy off CRT
- PBC with cirrhosis    - c/w home ursodiol

## 2019-10-01 NOTE — PROGRESS NOTE ADULT - PROBLEM SELECTOR PROBLEM 3
Type 2 diabetes mellitus
Insulin dependent type 2 diabetes mellitus, controlled
Insulin dependent type 2 diabetes mellitus, controlled
Type 2 diabetes mellitus

## 2019-10-01 NOTE — PROGRESS NOTE ADULT - PROBLEM SELECTOR PROBLEM 2
Primary biliary cholangitis
Acute blood loss anemia
Acute blood loss anemia
Primary biliary cholangitis

## 2019-10-01 NOTE — PROGRESS NOTE ADULT - PROBLEM SELECTOR PROBLEM 1
UGIB (upper gastrointestinal bleed)

## 2019-10-01 NOTE — PROGRESS NOTE ADULT - PROBLEM SELECTOR PLAN 6
DVT ppx: SCD, hold pharmacologic ppx in setting of GIB    disposition: will need to complete ceftriaxone and octreotide drip, per GI and likely DC today pending final GI recs.
DVT ppx: SCD, hold pharmacologic ppx in setting of GIB    disposition: will need to complete ceftriaxone and octreotide drip, per GI and likely DC tomorrow.

## 2019-10-01 NOTE — PROGRESS NOTE ADULT - PROBLEM/PLAN-1
Maximiliano Ohara,    Can you please call patient and let her know all labs were normal with the exception of  her Vitamin D, it was a little high, so advise patient to take a OTC Vitamin D 1000 mcg supplement daily. Also let her know cholesterol was elevated, so advise on cholesterol lowering diet. Thank you.
DISPLAY PLAN FREE TEXT

## 2019-10-01 NOTE — PROGRESS NOTE ADULT - PROBLEM SELECTOR PLAN 3
- fs controlled  - start carbohydrate controlled diet   - fs achs  - increase lantus to home dose of 12 units at bedtime with sliding scale insulin
- fs controlled  - start mechanical soft carbohydrate controlled diet   - fs achs  - c/w Lantus to home dose of 12 units at bedtime with sliding scale insulin

## 2019-10-01 NOTE — PROGRESS NOTE ADULT - PROBLEM SELECTOR PLAN 5
DVT ppx: SCD, hold pharmacologic ppx in setting of GIB    disposition: will need to complete ceftriaxone and octreotide drip, per GI  communication: discussed with  at bedside; discussed with floor np zach at [phone]
- In remission s/p b/l mastectomy off CRT
- In remission s/p b/l mastectomy off CRT
DVT ppx: SCD, hold pharmacologic ppx in setting of GIB    disposition: will need to complete ceftriaxone and octreotide drip, per GI  communication: discussed with  at bedside; discussed with floor np raghu at [phone]

## 2019-10-01 NOTE — PROGRESS NOTE ADULT - ATTENDING COMMENTS
Joy Awad DO  [facility]  [phone] Joy Awad DO  hospitalist  [phone]    Discharge time 45 minutes

## 2019-10-01 NOTE — PROGRESS NOTE ADULT - PROBLEM SELECTOR PLAN 1
New onset acute bright red hematemesis x3 and melanotic stool x1 concerning for UGIB 2' variceal bleeding s/p banding  - Hb 12.3 from baseline 14-15; hb today 10   - cbc q12h  - start protonix 40 mg po daily and carafate q6h  - start carbohydrate controlled/ DASH diet   - c/w ceftriaxone drip and octreotide drip x 5 days  - abdominal US with cirrhotic liver
New onset acute bright red hematemesis x3 and melanotic stool x1 concerning for UGIB 2' variceal bleeding s/p banding  - Hb 12.3 from baseline 14-15; hb 10.2  - cbc daily  - c/w protonix 40 mg po daily and carafate 1gm q6h  - mechanical soft- carbohydrate controlled/ DASH diet   - c/w ceftriaxone drip and octreotide drip x 5 days (10/1)
New onset acute bright red hematemesis x3 and melanotic stool x1 concerning for UGIB 2' variceal bleeding s/p banding  - Hb 12.3 from baseline 14-15; hb 10.2  - cbc q12h  - c/w protonix 40 mg po daily and carafate 1gm q6h  - start mechanical soft- carbohydrate controlled/ DASH diet   - c/w ceftriaxone drip and octreotide drip x 5 days (10/1)  - abdominal US with cirrhotic liver
New onset acute bright red hematemesis x3 and melanotic stool x1 concerning for UGIB 2' variceal bleeding s/p banding  - Hb 12.3 from baseline 14-15; hb today 10.5  - cbc q12h  - c/w protonix 40 mg po daily and carafate 1gm q6h  - start mechanical soft- carbohydrate controlled/ DASH diet   - c/w ceftriaxone drip and octreotide drip x 5 days  - abdominal US with cirrhotic liver

## 2019-10-02 PROBLEM — C50.919 MALIGNANT NEOPLASM OF UNSPECIFIED SITE OF UNSPECIFIED FEMALE BREAST: Chronic | Status: ACTIVE | Noted: 2019-09-26

## 2019-10-22 ENCOUNTER — LABORATORY RESULT (OUTPATIENT)
Age: 74
End: 2019-10-22

## 2019-10-22 ENCOUNTER — APPOINTMENT (OUTPATIENT)
Dept: HEPATOLOGY | Facility: CLINIC | Age: 74
End: 2019-10-22
Payer: COMMERCIAL

## 2019-10-22 VITALS
OXYGEN SATURATION: 94 % | DIASTOLIC BLOOD PRESSURE: 78 MMHG | RESPIRATION RATE: 16 BRPM | BODY MASS INDEX: 22.58 KG/M2 | WEIGHT: 112 LBS | HEIGHT: 59 IN | SYSTOLIC BLOOD PRESSURE: 151 MMHG | HEART RATE: 76 BPM

## 2019-10-22 DIAGNOSIS — Z92.89 PERSONAL HISTORY OF OTHER MEDICAL TREATMENT: ICD-10-CM

## 2019-10-22 DIAGNOSIS — F41.9 ANXIETY DISORDER, UNSPECIFIED: ICD-10-CM

## 2019-10-22 DIAGNOSIS — E11.9 TYPE 2 DIABETES MELLITUS W/OUT COMPLICATIONS: ICD-10-CM

## 2019-10-22 DIAGNOSIS — Z80.8 FAMILY HISTORY OF MALIGNANT NEOPLASM OF OTHER ORGANS OR SYSTEMS: ICD-10-CM

## 2019-10-22 DIAGNOSIS — Z78.9 OTHER SPECIFIED HEALTH STATUS: ICD-10-CM

## 2019-10-22 DIAGNOSIS — Z85.3 PERSONAL HISTORY OF MALIGNANT NEOPLASM OF BREAST: ICD-10-CM

## 2019-10-22 DIAGNOSIS — R60.0 LOCALIZED EDEMA: ICD-10-CM

## 2019-10-22 DIAGNOSIS — M81.0 AGE-RELATED OSTEOPOROSIS W/OUT CURRENT PATHOLOGICAL FRACTURE: ICD-10-CM

## 2019-10-22 DIAGNOSIS — Z80.3 FAMILY HISTORY OF MALIGNANT NEOPLASM OF BREAST: ICD-10-CM

## 2019-10-22 PROCEDURE — 99205 OFFICE O/P NEW HI 60 MIN: CPT

## 2019-10-22 PROCEDURE — 99214 OFFICE O/P EST MOD 30 MIN: CPT

## 2019-10-22 RX ORDER — SODIUM SULFATE, POTASSIUM SULFATE, MAGNESIUM SULFATE 17.5; 3.13; 1.6 G/ML; G/ML; G/ML
17.5-3.13-1.6 SOLUTION, CONCENTRATE ORAL
Qty: 1 | Refills: 0 | Status: ACTIVE | COMMUNITY
Start: 2019-10-22 | End: 1900-01-01

## 2019-10-22 RX ORDER — SERTRALINE 25 MG/1
25 TABLET, FILM COATED ORAL
Refills: 0 | Status: ACTIVE | COMMUNITY

## 2019-10-22 NOTE — ASSESSMENT
[FreeTextEntry1] : Ms. SUN is a 74 year old woman with PBC followed by Dr. Valdes in past (last seen around 2014), on ursodiol, diagnosed with cirrhosis when hospitalized 9/2019 (d/c 10/01) with variceal bleed, acute blood loss anemia (not transfused, last Hb 9.6), small ascites adjacent to gallbladder, and two indeterminate liver lesions of 2.1 cm in the left and right lobe on CT. MCV was 103, LFTs were normal upon repeat (Init. elev. ALP only). Discharged with nadolol. \par PMH: breast cancer s/p bilat. mastectomy and oophorectomy, osteoporosis not taking medication, DM2, never overweight/obese, does not drink alcohol.\par \par - cirrhosis, decompensated\par - PBC, on ursodiol 250 mg tid\par - variceal bleed 9/2019, banded, on nadolol 20 mg/d; Had acute blood loss anemia, Hb ~9.5, not transfused\par - trace ascites (CT 9/2019), trace/1+ edema; not on medication\par - encephalopathy: none\par - liver lesions on CT, indeterminate, 2x 2.1 cm in R and L lobe\par - renal function: normal\par - colon cancer screening: last colonoscopy 11 yrs ago\par \par Records of recent hospitalization reviewed.\par The nature of cirrhosis was discussed with possible development of hepatic encephalopathy, ascites, esophageal variceal bleeding, liver cancer, chronic kidney injury, fatal complications after significant surgery, need for liver transplant, and death.\par I explained that her liver disease predisposes her to osteoporosis and encouraged her to follow recommendations by her endocrinologist - she was prescribed medications in the past, but did not take them.\par \par \par \par Plan:\par - labs as below including AFP\par - MRI abdomen\par - varices, colon cancer screening: EGD and colonoscopy\par - continue ursodiol\par - return in 3 weeks to discuss MRI result and labs\par - osteoporosis: she has an appointment with her endocrinologist within a week

## 2019-10-22 NOTE — PHYSICAL EXAM
[General Appearance - Alert] : alert [General Appearance - In No Acute Distress] : in no acute distress [Extraocular Movements] : extraocular movements were intact [PERRL With Normal Accommodation] : pupils were equal in size, round, and reactive to light [Sclera] : the sclera and conjunctiva were normal [Oropharynx] : the oropharynx was normal [Outer Ear] : the ears and nose were normal in appearance [Neck Appearance] : the appearance of the neck was normal [Neck Cervical Mass (___cm)] : no neck mass was observed [Jugular Venous Distention Increased] : there was no jugular-venous distention [Thyroid Diffuse Enlargement] : the thyroid was not enlarged [Thyroid Nodule] : there were no palpable thyroid nodules [Auscultation Breath Sounds / Voice Sounds] : lungs were clear to auscultation bilaterally [Heart Rate And Rhythm] : heart rate was normal and rhythm regular [Heart Sounds] : normal S1 and S2 [Heart Sounds Pericardial Friction Rub] : no pericardial rub [Heart Sounds Gallop] : no gallops [Murmurs] : no murmurs [Abdominal Aorta] : the abdominal aorta was normal [Arterial Pulses Carotid] : carotid pulses were normal with no bruits [Arterial Pulses Femoral] : femoral pulses were normal without bruits [Bowel Sounds] : normal bowel sounds [Abdomen Soft] : soft [Abdomen Tenderness] : non-tender [Abdomen Mass (___ Cm)] : no abdominal mass palpated [Urethral Meatus] : normal urethra [Urinary Bladder Findings] : the bladder was normal on palpation [External Female Genitalia] : normal external genitalia [Cervical Lymph Nodes Enlarged Anterior Bilaterally] : anterior cervical [Supraclavicular Lymph Nodes Enlarged Bilaterally] : supraclavicular [Cervical Lymph Nodes Enlarged Posterior Bilaterally] : posterior cervical [Axillary Lymph Nodes Enlarged Bilaterally] : axillary [Inguinal Lymph Nodes Enlarged Bilaterally] : inguinal [Femoral Lymph Nodes Enlarged Bilaterally] : femoral [No CVA Tenderness] : no ~M costovertebral angle tenderness [No Spinal Tenderness] : no spinal tenderness [Abnormal Walk] : normal gait [Nail Clubbing] : no clubbing  or cyanosis of the fingernails [Motor Tone] : muscle strength and tone were normal [Musculoskeletal - Swelling] : no joint swelling seen [Skin Turgor] : normal skin turgor [] : no rash [Skin Color & Pigmentation] : normal skin color and pigmentation [Sensation] : the sensory exam was normal to light touch and pinprick [Deep Tendon Reflexes (DTR)] : deep tendon reflexes were 2+ and symmetric [Oriented To Time, Place, And Person] : oriented to person, place, and time [Impaired Insight] : insight and judgment were intact [No Focal Deficits] : no focal deficits [Affect] : the affect was normal [Abdominal  Ascites] : no ascites [Ascites Fluid Wave] : no ascites fluid wave [Splenomegaly] : no splenomegaly [Liver Palpable ___ Finger Breadths Below Costal Margin] : was not palpable below costal margin [Jaundice] : No jaundice [Depression] : no depression [Palmar Erythema] : no Palmar Erythema [FreeTextEntry1] : s/p bilat mastectomy

## 2019-10-23 LAB
25(OH)D3 SERPL-MCNC: 42.1 NG/ML
AFP-TM SERPL-MCNC: 2.1 NG/ML
ALBUMIN SERPL ELPH-MCNC: 4 G/DL
ALP BLD-CCNC: 174 U/L
ALT SERPL-CCNC: 27 U/L
ANION GAP SERPL CALC-SCNC: 13 MMOL/L
AST SERPL-CCNC: 36 U/L
BASOPHILS # BLD AUTO: 0.03 K/UL
BASOPHILS NFR BLD AUTO: 0.9 %
BILIRUB SERPL-MCNC: 0.6 MG/DL
BUN SERPL-MCNC: 9 MG/DL
CALCIUM SERPL-MCNC: 9.7 MG/DL
CANCER AG19-9 SERPL-ACNC: 19 U/ML
CHLORIDE SERPL-SCNC: 105 MMOL/L
CHOLEST SERPL-MCNC: 189 MG/DL
CHOLEST/HDLC SERPL: 3 RATIO
CO2 SERPL-SCNC: 26 MMOL/L
CREAT SERPL-MCNC: 0.51 MG/DL
DEPRECATED KAPPA LC FREE/LAMBDA SER: 0.87 RATIO
EOSINOPHIL # BLD AUTO: 0.18 K/UL
EOSINOPHIL NFR BLD AUTO: 5.7 %
ESTIMATED AVERAGE GLUCOSE: 151 MG/DL
FERRITIN SERPL-MCNC: 26 NG/ML
GGT SERPL-CCNC: 50 U/L
GLUCOSE SERPL-MCNC: 174 MG/DL
HBA1C MFR BLD HPLC: 6.9 %
HBV CORE IGG+IGM SER QL: NONREACTIVE
HBV SURFACE AB SER QL: NONREACTIVE
HBV SURFACE AG SER QL: NONREACTIVE
HCT VFR BLD CALC: 36.2 %
HCV AB SER QL: NONREACTIVE
HCV S/CO RATIO: 0.13 S/CO
HDLC SERPL-MCNC: 63 MG/DL
HEPATITIS A IGG ANTIBODY: REACTIVE
HGB BLD-MCNC: 11.3 G/DL
IGA SER QL IEP: 784 MG/DL
IGG SER QL IEP: 1499 MG/DL
IGM SER QL IEP: 158 MG/DL
IMM GRANULOCYTES NFR BLD AUTO: 0.3 %
INR PPP: 1.07 RATIO
IRON SATN MFR SERPL: 17 %
IRON SERPL-MCNC: 65 UG/DL
KAPPA LC CSF-MCNC: 2.65 MG/DL
KAPPA LC SERPL-MCNC: 2.31 MG/DL
LDLC SERPL CALC-MCNC: 81 MG/DL
LYMPHOCYTES # BLD AUTO: 0.93 K/UL
LYMPHOCYTES NFR BLD AUTO: 29.2 %
MAN DIFF?: NORMAL
MCHC RBC-ENTMCNC: 31.2 GM/DL
MCHC RBC-ENTMCNC: 32.3 PG
MCV RBC AUTO: 103.4 FL
MONOCYTES # BLD AUTO: 0.37 K/UL
MONOCYTES NFR BLD AUTO: 11.6 %
NEUTROPHILS # BLD AUTO: 1.66 K/UL
NEUTROPHILS NFR BLD AUTO: 52.3 %
PLATELET # BLD AUTO: 99 K/UL
POTASSIUM SERPL-SCNC: 3.8 MMOL/L
PROT SERPL-MCNC: 7.7 G/DL
PT BLD: 12.3 SEC
RBC # BLD: 3.5 M/UL
RBC # FLD: 13.1 %
SODIUM SERPL-SCNC: 144 MMOL/L
TIBC SERPL-MCNC: 390 UG/DL
TRIGL SERPL-MCNC: 224 MG/DL
UIBC SERPL-MCNC: 325 UG/DL
WBC # FLD AUTO: 3.18 K/UL

## 2019-10-24 LAB
MITOCHONDRIA AB SER IF-ACNC: ABNORMAL
SMOOTH MUSCLE AB SER QL IF: NORMAL

## 2019-10-25 LAB
ANA PAT FLD IF-IMP: ABNORMAL
ANA SER IF-ACNC: ABNORMAL
VIT A SERPL-MCNC: 17.9 UG/DL

## 2019-10-30 LAB — MENADIONE SERPL-MCNC: 1.43 NG/ML

## 2019-11-04 LAB
A1AT PHENOTYP SERPL-IMP: NORMAL BANDS
A1AT SERPL-MCNC: 151 MG/DL

## 2019-11-21 ENCOUNTER — APPOINTMENT (OUTPATIENT)
Dept: HEPATOLOGY | Facility: CLINIC | Age: 74
End: 2019-11-21
Payer: MEDICARE

## 2019-11-21 VITALS
HEIGHT: 59 IN | BODY MASS INDEX: 22.58 KG/M2 | OXYGEN SATURATION: 92 % | HEART RATE: 75 BPM | DIASTOLIC BLOOD PRESSURE: 67 MMHG | SYSTOLIC BLOOD PRESSURE: 139 MMHG | TEMPERATURE: 98.7 F | WEIGHT: 112 LBS

## 2019-11-21 DIAGNOSIS — D50.0 IRON DEFICIENCY ANEMIA SECONDARY TO BLOOD LOSS (CHRONIC): ICD-10-CM

## 2019-11-21 PROCEDURE — 99214 OFFICE O/P EST MOD 30 MIN: CPT

## 2019-11-21 RX ORDER — INSULIN LISPRO 100 [IU]/ML
INJECTION, SOLUTION INTRAVENOUS; SUBCUTANEOUS
Refills: 0 | Status: ACTIVE | COMMUNITY

## 2019-11-21 NOTE — CONSULT LETTER
[Dear  ___] : Dear  [unfilled], [Consult Letter:] : I had the pleasure of evaluating your patient, [unfilled]. [Please see my note below.] : Please see my note below. [Consult Closing:] : Thank you very much for allowing me to participate in the care of this patient.  If you have any questions, please do not hesitate to contact me. [Sincerely,] : Sincerely, [FreeTextEntry2] : LETHA HUITRON (PCP)\par  [FreeTextEntry3] : Scott Temple MD\par , [facility]\par General Hepatology and Gastroenterology\par [facility]\par Health system\par [address] [place] [address]\[place] [address]\par [phone]\par

## 2019-11-21 NOTE — ASSESSMENT
[FreeTextEntry1] : Ms. SUN is a 74 year old woman with PBC followed by Dr. Valdes in past (last seen around 2014), on ursodiol, diagnosed with cirrhosis when hospitalized 9/2019 (d/c 10/01) with variceal bleed, acute blood loss anemia (not transfused, last Hb 9.6), small ascites adjacent to gallbladder, and two indeterminate liver lesions of 2.1 cm in the left and right lobe on CT. MCV was 103, LFTs were normal upon repeat (Init. elev. ALP only). Discharged with nadolol.\par PMH: breast cancer s/p bilat. mastectomy and oophorectomy, osteoporosis not taking medication, DM2, never overweight/obese - BMI 22, does not drink alcohol.\par \par - cirrhosis, decompensated. MELD-Na 7 on 10/22/19\par - PBC, on ursodiol 250 mg tid (112 lbs/2.2 = 50.9 kg x 13 to 15 mg/kg = 660 to 760 mg/d).  \par - AST/ALT normal, ALT mildly elevated < 200. JH 1:160, AMA 1:80\par - variceal bleed 9/2019, banded, on nadolol 40 mg/d; Had acute blood loss anemia, Hb ~9.5, now macrocyticanemia Hb 11. \par - trace ascites (CT 9/2019), trace/1+ edema; not on medication\par - encephalopathy: none\par - liver lesions on CT, indeterminate, 2x 2.1 cm in R and L lobe\par - renal function: normal\par - DM2, hypertriglyceridemia\par - colon cancer screening: last colonoscopy 11 yrs ago\par \par Plan:\par - liver lesions: MRI abdomen\par - varices that bled, colon cancer screening: EGD with banding, and colonoscopy. Can take nadolol 40 mg qAM instead of 20bid\par - continue ursodiol\par - low vitamin A: I recommended to eat carrots now and then, or to take a multivitamin with Vitamin A.\par - return in 2 months. She will call me the day after her MRI.\par - osteoporosis: addressed at initial visit - endocrinologist was planned at that time.

## 2019-11-21 NOTE — HISTORY OF PRESENT ILLNESS
[Autoimmune Disorder] : autoimmune disorder [Needlestick Exposure] : no needlestick exposure [Infected Sexual Partner] : no infected sexual partner [IV Drug Use] : no IV drug use [Tattoo] : no tattoos [Body Piercing] : no body piercing [Hemodialysis] : no hemodialysis [Transfusion before 1992] : no transfusion before 1992 [Transplant before 1992] : no transplant before 1992 [Incarceration] : no incarceration [Alcohol Abuse] : no alcohol abuse [Household Contact to HBV] : no household contact to HBV [Travel to Endemic Area] : no travel to an endemic area [Occupational Exposure] : no occupational exposure [Cocaine Use] : no cocaine use [de-identified] : Ms. SUN is a 74 year old woman with PBC followed by Dr. Valdes in past (last seen around 2014), on ursodiol, osteoporosis not taking medication, DM2, diagnosed with cirrhosis when hospitalized 9/2019 (d/c 10/01) with variceal bleed, acute blood loss anemia (not transfused, last Hb 9.6), small ascites adjacent to gallbladder, and two indeterminate liver lesions of 2.1 cm in the left and right lobe on CT. MCV was 103, LFTs were normal upon repeat (Init. elev. ALP only). Discharged with nadolol\par Weight hx: 112 lbs, BMI 22.6. 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 States that she was not given information about  MRI and endoscopies, only about bloodwork at the  after her last visit. Hence, these exams have not been done.\par - 10/22/19: doing well since hospitalization, stool brown, taking nadolol. \par \par \par Workup:\par - 9/27/19 EGD: large esophageal varices with stigmata of recent bleed and red torsten signs, 4 bands placed. Portal HTN gastropathy.\par - 9/27/19: CT abdomen wwo: cirrhosis, abdominal varices, small pericholecystic fluid, two 2.1 cm liver lesions in L and R lobe - indeterminate; portal HTN colopathy\par - last DEXA scan around 2017 (has endocrinologist)\par - colonoscopy: 11 yrs ago

## 2019-11-28 ENCOUNTER — FORM ENCOUNTER (OUTPATIENT)
Age: 74
End: 2019-11-28

## 2019-11-29 ENCOUNTER — APPOINTMENT (OUTPATIENT)
Dept: MRI IMAGING | Facility: CLINIC | Age: 74
End: 2019-11-29
Payer: MEDICARE

## 2019-11-29 ENCOUNTER — OUTPATIENT (OUTPATIENT)
Dept: OUTPATIENT SERVICES | Facility: HOSPITAL | Age: 74
LOS: 1 days | End: 2019-11-29
Payer: COMMERCIAL

## 2019-11-29 DIAGNOSIS — Z90.13 ACQUIRED ABSENCE OF BILATERAL BREASTS AND NIPPLES: Chronic | ICD-10-CM

## 2019-11-29 DIAGNOSIS — K74.3 PRIMARY BILIARY CIRRHOSIS: ICD-10-CM

## 2019-11-29 PROCEDURE — A9585: CPT

## 2019-11-29 PROCEDURE — 74183 MRI ABD W/O CNTR FLWD CNTR: CPT

## 2019-11-29 PROCEDURE — 74183 MRI ABD W/O CNTR FLWD CNTR: CPT | Mod: 26

## 2019-12-15 ENCOUNTER — TRANSCRIPTION ENCOUNTER (OUTPATIENT)
Age: 74
End: 2019-12-15

## 2020-01-08 ENCOUNTER — APPOINTMENT (OUTPATIENT)
Dept: HEPATOLOGY | Facility: HOSPITAL | Age: 75
End: 2020-01-08

## 2020-01-08 ENCOUNTER — RESULT REVIEW (OUTPATIENT)
Age: 75
End: 2020-01-08

## 2020-01-08 ENCOUNTER — OUTPATIENT (OUTPATIENT)
Dept: OUTPATIENT SERVICES | Facility: HOSPITAL | Age: 75
LOS: 1 days | Discharge: HOME | End: 2020-01-08
Payer: COMMERCIAL

## 2020-01-08 VITALS
HEART RATE: 72 BPM | DIASTOLIC BLOOD PRESSURE: 59 MMHG | HEIGHT: 59 IN | RESPIRATION RATE: 16 BRPM | WEIGHT: 100.97 LBS | TEMPERATURE: 98 F | OXYGEN SATURATION: 96 % | SYSTOLIC BLOOD PRESSURE: 149 MMHG

## 2020-01-08 VITALS
HEART RATE: 80 BPM | SYSTOLIC BLOOD PRESSURE: 145 MMHG | RESPIRATION RATE: 16 BRPM | DIASTOLIC BLOOD PRESSURE: 72 MMHG | OXYGEN SATURATION: 100 %

## 2020-01-08 DIAGNOSIS — I85.01 ESOPHAGEAL VARICES WITH BLEEDING: ICD-10-CM

## 2020-01-08 DIAGNOSIS — Z90.13 ACQUIRED ABSENCE OF BILATERAL BREASTS AND NIPPLES: Chronic | ICD-10-CM

## 2020-01-08 PROCEDURE — 43244 EGD VARICES LIGATION: CPT

## 2020-01-08 PROCEDURE — 88312 SPECIAL STAINS GROUP 1: CPT | Mod: 26

## 2020-01-08 PROCEDURE — 88305 TISSUE EXAM BY PATHOLOGIST: CPT | Mod: 26

## 2020-01-08 PROCEDURE — 45380 COLONOSCOPY AND BIOPSY: CPT

## 2020-01-09 NOTE — ASSESSMENT
[FreeTextEntry1] : - 1/8/20 colonoscopy: inadequate prep, very difficult procedure due to extrinsic compression of sigmoid colon, cecum reached with gastroscope, but not evaluated well due to looping and difficult-to-clean layer of stool in cecum/ascending. 7 polyps removed with forceps: 6 mm polyp in sigmoid - advanced appearance, smaller polyps  IC valve (1), sigmoid (2), and rectum (3). F/u path, repeat with next EGD in 1 month with addit. bisacodyl, CT abdomen/pelvis.

## 2020-01-10 RX ORDER — BISACODYL 5 MG/1
5 TABLET ORAL ONCE
Qty: 3 | Refills: 0 | Status: ACTIVE | COMMUNITY
Start: 2020-01-10 | End: 1900-01-01

## 2020-01-21 ENCOUNTER — LABORATORY RESULT (OUTPATIENT)
Age: 75
End: 2020-01-21

## 2020-01-23 ENCOUNTER — APPOINTMENT (OUTPATIENT)
Dept: HEPATOLOGY | Facility: CLINIC | Age: 75
End: 2020-01-23
Payer: COMMERCIAL

## 2020-01-23 VITALS
HEART RATE: 70 BPM | DIASTOLIC BLOOD PRESSURE: 71 MMHG | WEIGHT: 105 LBS | HEIGHT: 59 IN | OXYGEN SATURATION: 93 % | TEMPERATURE: 98.6 F | SYSTOLIC BLOOD PRESSURE: 114 MMHG | BODY MASS INDEX: 21.17 KG/M2

## 2020-01-23 DIAGNOSIS — D75.89 OTHER SPECIFIED DISEASES OF BLOOD AND BLOOD-FORMING ORGANS: ICD-10-CM

## 2020-01-23 DIAGNOSIS — I85.01 ESOPHAGEAL VARICES WITH BLEEDING: ICD-10-CM

## 2020-01-23 DIAGNOSIS — K74.3 PRIMARY BILIARY CIRRHOSIS: ICD-10-CM

## 2020-01-23 DIAGNOSIS — K76.9 LIVER DISEASE, UNSPECIFIED: ICD-10-CM

## 2020-01-23 DIAGNOSIS — K74.60 UNSPECIFIED CIRRHOSIS OF LIVER: ICD-10-CM

## 2020-01-23 DIAGNOSIS — K25.9 GASTRIC ULCER, UNSPECIFIED AS ACUTE OR CHRONIC, W/OUT HEMORRHAGE OR PERFORATION: ICD-10-CM

## 2020-01-23 DIAGNOSIS — D12.6 BENIGN NEOPLASM OF COLON, UNSPECIFIED: ICD-10-CM

## 2020-01-23 PROCEDURE — 99214 OFFICE O/P EST MOD 30 MIN: CPT

## 2020-01-23 NOTE — CONSULT LETTER
[Dear  ___] : Dear  [unfilled], [Consult Letter:] : I had the pleasure of evaluating your patient, [unfilled]. [Please see my note below.] : Please see my note below. [Consult Closing:] : Thank you very much for allowing me to participate in the care of this patient.  If you have any questions, please do not hesitate to contact me. [Sincerely,] : Sincerely, [FreeTextEntry2] : LETHA HUITRON (PCP)\par  [FreeTextEntry3] : Scott Temple MD\par , [facility]\par General Hepatology and Gastroenterology\par [facility]\par [facility]\par [address] [place] [address]\[place] [address]\par [phone]\par

## 2020-01-23 NOTE — ASSESSMENT
[FreeTextEntry1] : Ms. SUN is a 74 year old woman with PBC followed by Dr. Valdes in past (last seen around 2014), on ursodiol, diagnosed with cirrhosis when hospitalized 9/2019 with variceal bleed, acute blood loss anemia (not transfused, last Hb 9.6), small ascites adjacent to gallbladder, and two indeterminate liver lesions of 2.1 cm in the left and right lobe on CT. MCV was 103, LFTs were normal upon repeat (Init. elev. ALP only). Discharged with nadolol.\par PMH: breast cancer s/p bilat. mastectomy and oophorectomy, osteoporosis not taking medication, DM2, never overweight/obese - BMI 22, does not drink alcohol. Her  is a psychiatrist.\par \par - cirrhosis, decompensated. MELD-Na 7 on 10/22/19\par - PBC, on ursodiol 250 mg tid (112 lbs/2.2 = 50.9 kg x 13 to 15 mg/kg = 660 to 760 mg/d).  \par - AST/ALT normal, ALT mildly elevated < 200. JH 1:160, AMA 1:80\par - variceal bleed 9/2019, banded - last on 1/8/20, on nadolol 20 mg bid b/o fatigue with once daily dosing; Had acute blood loss anemia that resolved from Hb ~9.5, now macrocytosis.\par - small ascites seen on CT 9/2019 and MRI 11/2019, trace/1+ edema; not on medication\par - encephalopathy: none\par - liver lesions on CT, indeterminate: not seen on MRI 11/2019.\par - renal function: normal\par - DM2, hypertriglyceridemia\par - gastric erosion, mild chronic gastritis, H. p. negative: no treatment.\par - colon adenoma, serrated 1/2020, inadequate prep.\par \par Plan:\par - varices that bled: EGD with banding already scheduled for Feb\par - inadequate bowel prep: colonoscopy scheduled with next EGD, with added bisacodyl. Also advised to drink an additional 1-2 cups of water after each half of the prep\par - continue nadolol 20 mg bid\par - continue ursodiol\par - macrocytosis: folate and VitB12 level with MELD labs before next visit in 3 months\par - low vitamin A: I recommended to eat carrots now and then, or to take a multivitamin with Vitamin A.\par - osteoporosis: addressed at initial visit - endocrinologist was planned at that time.

## 2020-01-23 NOTE — HISTORY OF PRESENT ILLNESS
[Autoimmune Disorder] : autoimmune disorder [Needlestick Exposure] : no needlestick exposure [Infected Sexual Partner] : no infected sexual partner [IV Drug Use] : no IV drug use [Tattoo] : no tattoos [Body Piercing] : no body piercing [Hemodialysis] : no hemodialysis [Transplant before 1992] : no transplant before 1992 [Incarceration] : no incarceration [Transfusion before 1992] : no transfusion before 1992 [Household Contact to HBV] : no household contact to HBV [Alcohol Abuse] : no alcohol abuse [Travel to Endemic Area] : no travel to an endemic area [Occupational Exposure] : no occupational exposure [Cocaine Use] : no cocaine use [de-identified] : - 1/23/20: returns after MRI, EGD, colonoscopy and bloodwork. Doing well, taking nadolol 20 mg bid b/o fatigue if 40 taken in the morning, taking ursodiol. Denies pruritus again. I had spoken to her about her test results three times on the phone since the last visit.\par \par - 11/21/19: doing well, taking ursodiol tid, nadolol 20 bid. States that she was not given information about  MRI and endoscopies, only about bloodwork at the  after her last visit. Hence, these exams have not been done.\par - 10/22/19: doing well since hospitalization, stool brown, taking nadolol. \par \par Weight hx: 112 lbs, BMI 22.6. 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 Mild portal HTN gastropathy, mild prepyloric erythema, single erosion. Biopsied. Normal Duodenum.\par - 1/8/20 colonoscopy: inadequate prep, very difficult procedure due to extrinsic compression of sigmoid colon, cecum reached with gastroscope, but not evaluated well due to looping and difficult-to-clean layer of stool in cecum/ascending. 7 polyps removed with forceps: 6 mm polyp in sigmoid - advanced appearance, smaller polyps IC valve (1), sigmoid (2), and rectum (3). F/u path, repeat with next EGD in 1 month with addit. bisacodyl, CT abdomen/pelvis. Path: sigmoid traditional serrated adenoma, others: normal or hyperplastic.\par - 9/27/19 EGD: large esophageal varices with stigmata of recent bleed and red torsten signs, 4 bands placed. Portal HTN gastropathy. Path: mild chronic gastritis, neg. for H. pylori.\par - 11/29/19 MRI abdomen: Lungs: RML patchy opacity. Left axillary surgical clips. prominent cardiophrenic nodes 1.4 cm unchanged from 9/27/19. Liver: cirrhosis, trace ascites, no MRI correlated to the indeterminate lesions on recent CT. Heterogeneous liver with periportal halo sign, c/w PBC. Prominent periportal and retroperitoneal LN 2.4 cm.. Couple pancreatic cystic lesions including a 0.7 cm in neck contiguous with nondilated main duct, likely sidebranch IPMN. \par - 9/27/19: CT abdomen wwo: cirrhosis, abdominal varices, small pericholecystic fluid, two 2.1 cm liver lesions in L and R lobe - indeterminate; portal HTN colopathy\par - last DEXA scan around 2017 (has endocrinologist)\par

## 2020-02-12 ENCOUNTER — OUTPATIENT (OUTPATIENT)
Dept: OUTPATIENT SERVICES | Facility: HOSPITAL | Age: 75
LOS: 1 days | Discharge: HOME | End: 2020-02-12
Payer: COMMERCIAL

## 2020-02-12 ENCOUNTER — APPOINTMENT (OUTPATIENT)
Dept: HEPATOLOGY | Facility: HOSPITAL | Age: 75
End: 2020-02-12

## 2020-02-12 ENCOUNTER — RESULT REVIEW (OUTPATIENT)
Age: 75
End: 2020-02-12

## 2020-02-12 VITALS
RESPIRATION RATE: 18 BRPM | SYSTOLIC BLOOD PRESSURE: 149 MMHG | DIASTOLIC BLOOD PRESSURE: 64 MMHG | HEART RATE: 68 BPM | OXYGEN SATURATION: 97 %

## 2020-02-12 VITALS
WEIGHT: 98.11 LBS | DIASTOLIC BLOOD PRESSURE: 59 MMHG | HEIGHT: 59 IN | RESPIRATION RATE: 17 BRPM | TEMPERATURE: 98 F | SYSTOLIC BLOOD PRESSURE: 132 MMHG | OXYGEN SATURATION: 96 % | HEART RATE: 65 BPM

## 2020-02-12 DIAGNOSIS — K74.60 UNSPECIFIED CIRRHOSIS OF LIVER: ICD-10-CM

## 2020-02-12 DIAGNOSIS — Z90.13 ACQUIRED ABSENCE OF BILATERAL BREASTS AND NIPPLES: Chronic | ICD-10-CM

## 2020-02-12 PROCEDURE — 88305 TISSUE EXAM BY PATHOLOGIST: CPT | Mod: 26

## 2020-02-12 PROCEDURE — 45380 COLONOSCOPY AND BIOPSY: CPT

## 2020-02-12 PROCEDURE — 43244 EGD VARICES LIGATION: CPT

## 2020-02-12 RX ORDER — INSULIN GLARGINE 100 [IU]/ML
12 INJECTION, SOLUTION SUBCUTANEOUS
Qty: 0 | Refills: 0 | DISCHARGE

## 2020-02-12 RX ORDER — SODIUM CHLORIDE 9 MG/ML
500 INJECTION, SOLUTION INTRAVENOUS
Refills: 0 | Status: DISCONTINUED | OUTPATIENT
Start: 2020-02-12 | End: 2020-03-03

## 2020-02-12 RX ORDER — URSODIOL 250 MG/1
1 TABLET, FILM COATED ORAL
Qty: 0 | Refills: 0 | DISCHARGE

## 2020-02-12 RX ORDER — SERTRALINE 25 MG/1
1 TABLET, FILM COATED ORAL
Qty: 0 | Refills: 0 | DISCHARGE

## 2020-02-12 NOTE — ADDENDUM
[FreeTextEntry1] : - 2/12/20 EGD: large varices above GEJ where they were small, 9 bands placed. Init. oozing after passage with cap, stopped. PHG. Repeat in 1 mo

## 2020-02-12 NOTE — ASSESSMENT
[FreeTextEntry1] : - 2/12/20 colonoscopy: again difficult, done with EGD scope. 3 small polyps removed. Small erosion ascending likely prior biopsy site, biopsied. Internal hemorrhoids.

## 2020-07-08 ENCOUNTER — APPOINTMENT (OUTPATIENT)
Dept: HEPATOLOGY | Facility: HOSPITAL | Age: 75
End: 2020-07-08

## 2020-07-14 ENCOUNTER — TRANSCRIPTION ENCOUNTER (OUTPATIENT)
Age: 75
End: 2020-07-14

## 2020-09-05 DIAGNOSIS — Z01.818 ENCOUNTER FOR OTHER PREPROCEDURAL EXAMINATION: ICD-10-CM

## 2020-09-06 ENCOUNTER — APPOINTMENT (OUTPATIENT)
Dept: DISASTER EMERGENCY | Facility: CLINIC | Age: 75
End: 2020-09-06

## 2020-09-07 LAB — SARS-COV-2 N GENE NPH QL NAA+PROBE: NOT DETECTED

## 2020-09-09 ENCOUNTER — APPOINTMENT (OUTPATIENT)
Dept: HEPATOLOGY | Facility: HOSPITAL | Age: 75
End: 2020-09-09

## 2020-09-09 ENCOUNTER — OUTPATIENT (OUTPATIENT)
Dept: OUTPATIENT SERVICES | Facility: HOSPITAL | Age: 75
LOS: 1 days | Discharge: HOME | End: 2020-09-09
Payer: COMMERCIAL

## 2020-09-09 VITALS
TEMPERATURE: 97 F | RESPIRATION RATE: 20 BRPM | OXYGEN SATURATION: 99 % | DIASTOLIC BLOOD PRESSURE: 76 MMHG | HEART RATE: 71 BPM | HEIGHT: 59 IN | WEIGHT: 104.94 LBS | SYSTOLIC BLOOD PRESSURE: 169 MMHG

## 2020-09-09 VITALS
SYSTOLIC BLOOD PRESSURE: 129 MMHG | DIASTOLIC BLOOD PRESSURE: 85 MMHG | OXYGEN SATURATION: 97 % | RESPIRATION RATE: 19 BRPM | HEART RATE: 67 BPM

## 2020-09-09 DIAGNOSIS — I85.01 ESOPHAGEAL VARICES WITH BLEEDING: ICD-10-CM

## 2020-09-09 DIAGNOSIS — Z90.13 ACQUIRED ABSENCE OF BILATERAL BREASTS AND NIPPLES: Chronic | ICD-10-CM

## 2020-09-09 PROCEDURE — 43244 EGD VARICES LIGATION: CPT

## 2021-08-03 RX ORDER — NADOLOL 20 MG/1
20 TABLET ORAL
Qty: 180 | Refills: 1 | Status: ACTIVE | COMMUNITY
Start: 1900-01-01 | End: 1900-01-01

## 2021-08-03 RX ORDER — URSODIOL 250 MG/1
250 TABLET ORAL 3 TIMES DAILY
Qty: 270 | Refills: 1 | Status: ACTIVE | COMMUNITY
Start: 1900-01-01 | End: 1900-01-01

## 2021-09-20 NOTE — ASU PREOP CHECKLIST - BMI (KG/M2)
If you are a smoker, it is important for your health to stop smoking. Please be aware that second hand smoke is also harmful. 21.2

## 2022-01-03 NOTE — PROGRESS NOTE ADULT - ASSESSMENT
74 year old female with PMH HTN, DM, PBC, breast cancer s/p b/l mastectomy, presents with new acute hematemesis and melena admitted for upper GI bleeding s/p EGD with banding, now on ceftriaxone drip and octreotide drip. No

## 2022-08-04 NOTE — ASU PREOP CHECKLIST - ASSESSMENT, HISTORY & PHYSICAL COMPLETED AND ON MEDICAL RECORD
Alert and oriented x 4.  at bedside. No c/o pain or sign of distress. Safety measures in place. VS stable.   done

## 2022-11-26 NOTE — ED ADULT NURSE NOTE - PAIN RATING/NUMBER SCALE (0-10): REST
Date/Time Patient Seen:  		  Referring MD:   Data Reviewed	       Patient is a 89y old  Male who presents with a chief complaint of Cystitis (25 Nov 2022 16:12)      Subjective/HPI     PAST MEDICAL & SURGICAL HISTORY:  Dementia    Kidney stones    Stented coronary artery          Medication list         MEDICATIONS  (STANDING):  aspirin enteric coated 81 milliGRAM(s) Oral daily  atorvastatin 20 milliGRAM(s) Oral daily  dorzolamide 2% Ophthalmic Solution 1 Drop(s) Both EYES three times a day  enoxaparin Injectable 40 milliGRAM(s) SubCutaneous every 24 hours  finasteride 5 milliGRAM(s) Oral daily  lactulose Syrup 10 Gram(s) Oral at bedtime  memantine 5 milliGRAM(s) Oral two times a day  meropenem  IVPB      meropenem  IVPB 1000 milliGRAM(s) IV Intermittent every 8 hours  tamsulosin 0.4 milliGRAM(s) Oral daily    MEDICATIONS  (PRN):  acetaminophen     Tablet .. 650 milliGRAM(s) Oral every 6 hours PRN Temp greater or equal to 38C (100.4F), Mild Pain (1 - 3)         Vitals log        ICU Vital Signs Last 24 Hrs  T(C): 37.5 (26 Nov 2022 04:46), Max: 37.5 (26 Nov 2022 04:46)  T(F): 99.5 (26 Nov 2022 04:46), Max: 99.5 (26 Nov 2022 04:46)  HR: 73 (26 Nov 2022 04:46) (36 - 73)  BP: 127/70 (26 Nov 2022 04:46) (92/53 - 127/70)  BP(mean): --  ABP: --  ABP(mean): --  RR: 18 (26 Nov 2022 04:46) (18 - 18)  SpO2: 99% (26 Nov 2022 04:46) (95% - 99%)    O2 Parameters below as of 26 Nov 2022 04:46  Patient On (Oxygen Delivery Method): room air                 Input and Output:  I&O's Detail    25 Nov 2022 07:01  -  26 Nov 2022 06:58  --------------------------------------------------------  IN:  Total IN: 0 mL    OUT:    Stool (mL): 1 mL    Voided (mL): 100 mL  Total OUT: 101 mL    Total NET: -101 mL          Lab Data                        12.4   6.65  )-----------( 91       ( 25 Nov 2022 06:27 )             38.6     11-25    144  |  116<H>  |  13  ----------------------------<  91  4.1   |  24  |  0.94    Ca    9.2      25 Nov 2022 06:27    TPro  5.7<L>  /  Alb  2.5<L>  /  TBili  1.0  /  DBili  x   /  AST  17  /  ALT  9<L>  /  AlkPhos  71  11-25            Review of Systems	      Objective     Physical Examination    heart s1s2  lung dec BS  head nc      Pertinent Lab findings & Imaging      Ondina:  NO   Adequate UO     I&O's Detail    25 Nov 2022 07:01  -  26 Nov 2022 06:58  --------------------------------------------------------  IN:  Total IN: 0 mL    OUT:    Stool (mL): 1 mL    Voided (mL): 100 mL  Total OUT: 101 mL    Total NET: -101 mL               Discussed with:     Cultures:	        Radiology                             1

## 2024-01-03 NOTE — H&P ADULT - ASSESSMENT
Patient: Jack Cueto    Procedure Summary       Date: 01/03/24 Room / Location: [facility]    Anesthesia Start: 1120 Anesthesia Stop: 1153    Procedure: COLONOSCOPY Diagnosis:       Change in bowel habits      RLQ abdominal pain    Scheduled Providers: Jonathan Adan MD Responsible Provider: AMARI Riley    Anesthesia Type: MAC ASA Status: 3            Anesthesia Type: MAC    Vitals Value Taken Time   /85 01/03/24 1154   Temp 36.2 °C (97.1 °F) 01/03/24 1154   Pulse 85 01/03/24 1154   Resp 16 01/03/24 1154   SpO2 92 % 01/03/24 1154       Anesthesia Post Evaluation    Patient location during evaluation: bedside  Patient participation: complete - patient participated  Level of consciousness: awake and alert  Pain score: 2  Pain management: adequate  Airway patency: patent  Cardiovascular status: acceptable  Respiratory status: acceptable  Hydration status: acceptable  Postoperative Nausea and Vomiting: none    There were no known notable events for this encounter.    
74y F w/ PMH HTN, DM, PBC, breast cancer s/p b/l mastectomy, p/w new acute hematemesis and melena tonight concerning for UGIB.

## 2024-12-20 NOTE — HISTORY OF PRESENT ILLNESS
[Autoimmune Disorder] : autoimmune disorder [Needlestick Exposure] : no needlestick exposure [Infected Sexual Partner] : no infected sexual partner [IV Drug Use] : no IV drug use [Tattoo] : no tattoos [Hemodialysis] : no hemodialysis [Body Piercing] : no body piercing [Transfusion before 1992] : no transfusion before 1992 [Transplant before 1992] : no transplant before 1992 [Incarceration] : no incarceration [Alcohol Abuse] : no alcohol abuse [Household Contact to HBV] : no household contact to HBV [Travel to Endemic Area] : no travel to an endemic area [Cocaine Use] : no cocaine use [Occupational Exposure] : no occupational exposure [de-identified] : Ms. SUN is a 74 year old woman with PBC followed by Dr. Valdes in past (last seen around 2014), on ursodiol, osteoporosis not taking medication, DM2, diagnosed with cirrhosis when hospitalized 9/2019 (d/c 10/01) with variceal bleed, acute blood loss anemia (not transfused, last Hb 9.6), small ascites adjacent to gallbladder, and two indeterminate liver lesions of 2.1 cm in the left and right lobe on CT. MCV was 103, LFTs were normal upon repeat (Init. elev. ALP only). Discharged with nadolol\par Weight hx: 112 lbs, BMI 22.6. 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 \par \par \par Workup:\par - 9/27/19 EGD: large esophageal varices with stigmata of recent bleed and red torsten signs, 4 bands placed. Portal HTN gastropathy.\par - 9/27/19: CT abdomen wwo: cirrhosis, abdominal varices, small pericholecystic fluid, two 2.1 cm liver lesions in L and R lobe - indeterminate; portal HTN colopathy\par - last DEXA scan around 2017 (has endocrinologist)\par - colonoscopy: 11 yrs ago Him/He

## 2025-06-26 NOTE — ASU PATIENT PROFILE, ADULT - NS PRO TALK SOMEONE YN
Keyonna De La Garza is a 74 year old patient who comes in today for a Blood Pressure check.  Initial BP:  /72 (BP Location: Left arm, Patient Position: Sitting, Cuff Size: Adult Large)   Pulse 72   SpO2 98%      72  Disposition: follow-up as previously indicated by provider and provider notified while patient in the clinic     Neeru CAR MA on 6/26/2025 at 10:41 AM     no

## 2025-07-15 NOTE — ED PROVIDER NOTE - PMH
Name: Derek Arango      : 1972      MRN: [id number]  Encounter Provider: Kirsten Dupont MD  Encounter Date: 7/15/2025   Encounter department: [facility]    No chief complaint on file.  :  Assessment & Plan  Type 2 diabetes mellitus with hyperglycemia, without long-term current use of insulin (HCC)    Lab Results   Component Value Date    HGBA1C 5.9 07/15/2025       Orders:    POCT hemoglobin A1c    Tirzepatide (Mounjaro) 10 MG/0.5ML SOAJ; Inject 10 mg once a week.    Comprehensive metabolic panel; Future    TSH, 3rd generation; Future    CBC and differential; Future    Lipid Panel with Direct LDL reflex; Future    Hemoglobin A1c (w/out EAG); Future    Microalbumin, Random Urine (W/Creatinine); Future    Microalbuminuria due to type 2 diabetes mellitus  (HCC)    Lab Results   Component Value Date    HGBA1C 5.9 07/15/2025       Orders:    Comprehensive metabolic panel; Future    TSH, 3rd generation; Future    CBC and differential; Future    Lipid Panel with Direct LDL reflex; Future    Hemoglobin A1c (w/out EAG); Future    Microalbumin, Random Urine (W/Creatinine); Future    Primary hypertension    Orders:    Comprehensive metabolic panel; Future    TSH, 3rd generation; Future    CBC and differential; Future    Lipid Panel with Direct LDL reflex; Future    Hemoglobin A1c (w/out EAG); Future    Microalbumin, Random Urine (W/Creatinine); Future    Mixed hyperlipidemia    Orders:    Comprehensive metabolic panel; Future    TSH, 3rd generation; Future    CBC and differential; Future    Lipid Panel with Direct LDL reflex; Future    Hemoglobin A1c (w/out EAG); Future    Microalbumin, Random Urine (W/Creatinine); Future      Assessment & Plan  1. Type 2 diabetes mellitus: Stable.  - Hemoglobin A1c level remains stable at 5.9%, indicating excellent control of diabetes.  - Discussed increasing Mounjaro to 10 mg once a week to potentially aid in further weight loss  and diabetes control. If gastrointestinal discomfort occurs, the dosage will be reduced. Reviewed the importance of continuing Jardiance 10 mg daily and metformin 1000 mg twice a day. Advised to continue monitoring blood glucose levels daily. Fasting lab work order will be placed for the next visit in approximately 4 months to include a urine study and thyroid function tests.  - Mounjaro increased to 10 mg once a week. No changes to Jardiance or metformin. Lab work order for next visit.    2. Obesity.  - Lost approximately 18-20 pounds since the last visit and a total of 27 pounds since starting Mounjaro.  - Discussed increasing Mounjaro to 10 mg once a week to potentially aid in further weight loss. If gastrointestinal discomfort occurs, the dosage will be reduced.  - Mounjaro increased to 10 mg once a week.    3. Hypertension.  - Currently on hydrochlorothiazide 25 mg daily and lisinopril 10 mg daily for blood pressure control and kidney protection from diabetes. No changes to hypertension management plan are necessary at this time.    4. Hyperlipidemia.  - Currently on atorvastatin 20 mg daily for cholesterol management. No changes to hyperlipidemia management plan are necessary at this time.  Repeat lipid panel will be performed with her next visit.    5.  Microalbuminuria.  She will continue the current dose of lisinopril 10 mg daily.  A urine microalbumin to creatinine ratio will be performed with her next visit.    Follow-up: Next visit in approximately 4 months with preceding hemoglobin A1c, CMP, CBC, TSH, lipid panel, and urine microalbumin to creatinine ratio.      Pertinent Medical History   Derek Arango  is a 53-year-old female with a history of type 2 diabetes, obesity, hypertension, and hyperlipidemia.    Her diabetes was diagnosed about 4 to 5 years ago. She was initially placed on metformin, Jardiance, and glimepiride. Rybelsus was tried but found to be ineffective, leading to the initiation of  glimepiride. In February 2025, she was switched from glimepiride to Mounjaro.    Diabetes complications include microalbuminuria. She reports no neuropathy, retinopathy, heart attack, stroke, or claudication.        History of Present Illness   History of Present Illness  Derek Arango  is a 53-year-old female with a history of type 2 diabetes, obesity, hypertension, and hyperlipidemia, here for a follow-up visit.    Her diabetes was diagnosed about 4 to 5 years ago. She was initially placed on metformin, Jardiance, and glimepiride. Rybelsus was tried but found to be ineffective, leading to the initiation of glimepiride. In February 2025, she was switched from glimepiride to Mounjaro.     She reports mild nausea and reflux but no significant stomach issues or abdominal pain. She has lost approximately 20 pounds since her last visit. She does not experience frequent urination or nighttime urination. She occasionally feels very thirsty or hungry. She typically takes Mounjaro on Sundays, which suppresses her appetite until Thursday, but by Friday, she feels extremely hungry.     She reports no blurry vision, numbness, tingling in her feet, or foot wounds. She experienced digestive issues after consuming something unusual during her vacation and took Tums to alleviate the symptoms. She is currently on Jardiance 10 mg daily, metformin 1000 mg twice daily, and Mounjaro 7.5 mg once weekly. Glimepiride has been discontinued.     Diabetes complications include microalbuminuria. She reports no neuropathy, retinopathy, heart attack, stroke, or claudication.    She is on atorvastatin 20 mg daily for cholesterol management.    She is on hydrochlorothiazide 25 mg daily and lisinopril 10 mg daily for blood pressure control and kidney protection from diabetes. She reports no chest pain or shortness of breath.    Home blood glucometer readings:   She monitors her blood sugar levels during the week, usually first thing in the  "morning, which range from the 130s to 150s. During a recent vacation, when she was not monitoring her diet, her blood sugar levels were in the 120s. She has never experienced low blood sugar levels.     Current diabetic regimen:   He takes Jardiance 10 mg daily, metformin 1000 mg twice daily, and Mounjaro 7.5 mg once a week for diabetes management.    She takes atorvastatin 20 mg daily for hyperlipidemia.    She takes hydrochlorothiazide 25 mg daily and lisinopril 10 mg daily for hypertension.      Review of Systems as per HPI    Medical History Reviewed by provider this encounter:  Tobacco  Allergies  Meds  Problems  Med Hx  Surg Hx  Fam Hx     .  Medications Ordered Prior to Encounter[1]   Social History[2]     Medical History Reviewed by provider this encounter:  Tobacco  Allergies  Meds  Problems  Med Hx  Surg Hx  Fam Hx     .    Objective   /78   Pulse 82   Ht 5' 5\" (1.651 m)   Wt (!) 143 kg (315 lb)   BMI 52.42 kg/m²      Body mass index is 52.42 kg/m².  Wt Readings from Last 3 Encounters:   07/15/25 (!) 143 kg (315 lb)   07/09/25 (!) 141 kg (310 lb)   05/09/25 (!) 146 kg (322 lb 9.6 oz)     Physical Exam    Physical Exam  Vitals and nursing note reviewed.   Constitutional:       General: She is not in acute distress.     Appearance: Normal appearance. She is well-developed. She is obese.   HENT:      Head: Normocephalic and atraumatic.     Eyes:      Conjunctiva/sclera: Conjunctivae normal.     Neck:      Vascular: No carotid bruit.      Comments: Thyroid normal in size.  No palpable thyroid nodules.  Cardiovascular:      Rate and Rhythm: Normal rate and regular rhythm.      Heart sounds: Normal heart sounds. No murmur heard.  Pulmonary:      Effort: Pulmonary effort is normal. No respiratory distress.      Breath sounds: Normal breath sounds. No wheezing.   Abdominal:      Palpations: Abdomen is soft.     Musculoskeletal:         General: No swelling.      Cervical back: Neck supple. " Cirrhosis    Diabetes    Hypertension    Panic disorder      Right lower leg: No edema.      Left lower leg: No edema.   Lymphadenopathy:      Cervical: No cervical adenopathy.     Skin:     General: Skin is warm and dry.     Neurological:      Mental Status: She is alert and oriented to person, place, and time.     Psychiatric:         Mood and Affect: Mood normal.       Last Eye Exam: Not on file  Last Foot Exam: 10/03/2024  Health Maintenance   Topic Date Due    Diabetic Eye Exam  Never done    Diabetic Foot Exam  10/03/2025       Results    Labs: I have reviewed pertinent labs including:   Lab Results   Component Value Date    HGBA1C 5.9 07/15/2025    HGBA1C 5.9 02/06/2025    HGBA1C 6.6 (H) 10/09/2024      Lab Results   Component Value Date    CREATININE 0.77 01/29/2025    CREATININE 0.67 10/09/2024    CREATININE 0.64 06/05/2024    BUN 22 01/29/2025    K 4.4 01/29/2025     01/29/2025    CO2 23 01/29/2025      GFR, Calculated   Date Value Ref Range Status   06/05/2024 108 > OR = 60 mL/min/1.73m2 Final     eGFR   Date Value Ref Range Status   01/29/2025 93 > OR = 60 mL/min/1.73m2 Final      HDL   Date Value Ref Range Status   10/09/2024 51 > OR = 50 mg/dL Final     Triglycerides   Date Value Ref Range Status   10/09/2024 123 <150 mg/dL Final      ALT   Date Value Ref Range Status   01/29/2025 20 6 - 29 U/L Final     ALANINE AMINOTRANSFERASE   Date Value Ref Range Status   06/05/2024 23 6 - 29 U/L Final     AST   Date Value Ref Range Status   01/29/2025 12 10 - 35 U/L Final   06/05/2024 12 10 - 35 U/L Final     Alkaline Phosphatase   Date Value Ref Range Status   01/29/2025 67 37 - 153 U/L Final   06/05/2024 64 37 - 153 U/L Final           Patient Instructions   Hgba1c is 5.9%. this is excellent.     We'll try increasing the mounjaro to 10 mg once a week. Hopefully we can then decrease the metformin to 500 mg twice a day.     For now, no change in jardiance or metformin.     Continue to test blood sugars once a day.    Follow up in 4 months with blood work.      Discussed with the patient and all questioned fully answered. She will call me if any problems arise.           [1]   Current Outpatient Medications on File Prior to Visit   Medication Sig Dispense Refill    aspirin (ECOTRIN LOW STRENGTH) 81 mg EC tablet Take by mouth in the morning.      atorvastatin (LIPITOR) 20 mg tablet Take 1 tablet (20 mg total) by mouth every evening 90 tablet 1    Cyanocobalamin 1000 MCG CAPS Take by mouth in the morning.      Empagliflozin (JARDIANCE) 10 MG TABS tablet Take 1 tablet (10 mg total) by mouth daily 90 tablet 3    Ferrous Sulfate (IRON PO) Take by mouth in the morning      fexofenadine (ALLEGRA) 180 MG tablet Take by mouth in the morning.      hydroCHLOROthiazide 25 mg tablet Take 1 tablet (25 mg total) by mouth daily 90 tablet 1    lisinopril (ZESTRIL) 10 mg tablet Take 1 tablet (10 mg total) by mouth daily 90 tablet 1    MAGNESIUM PO Take by mouth in the morning      metFORMIN (GLUCOPHAGE) 1000 MG tablet Take 1 tablet (1,000 mg total) by mouth 2 (two) times a day with meals 180 tablet 3    Multiple Vitamin (MULTIVITAMIN ADULT PO) Take by mouth in the morning      Tirzepatide (Mounjaro) 7.5 MG/0.5ML SOAJ Inject 7.5 mg once a week 6 mL 3    Turmeric 500 MG CAPS Take by mouth in the morning       No current facility-administered medications on file prior to visit.   [2]   Social History  Tobacco Use    Smoking status: Never    Smokeless tobacco: Never   Vaping Use    Vaping status: Never Used   Substance and Sexual Activity    Alcohol use: Yes     Alcohol/week: 3.0 standard drinks of alcohol     Types: 1 Glasses of wine, 1 Shots of liquor, 1 Standard drinks or equivalent per week     Comment: socially a few time a month    Drug use: Never    Sexual activity: Yes     Partners: Female     Birth control/protection: None